# Patient Record
Sex: FEMALE | Race: WHITE | NOT HISPANIC OR LATINO
[De-identification: names, ages, dates, MRNs, and addresses within clinical notes are randomized per-mention and may not be internally consistent; named-entity substitution may affect disease eponyms.]

---

## 2017-01-18 ENCOUNTER — APPOINTMENT (OUTPATIENT)
Dept: INTERNAL MEDICINE | Facility: CLINIC | Age: 42
End: 2017-01-18

## 2017-01-18 VITALS
BODY MASS INDEX: 29.45 KG/M2 | TEMPERATURE: 98 F | WEIGHT: 150 LBS | HEIGHT: 60 IN | SYSTOLIC BLOOD PRESSURE: 130 MMHG | DIASTOLIC BLOOD PRESSURE: 80 MMHG

## 2017-01-19 ENCOUNTER — RESULT REVIEW (OUTPATIENT)
Age: 42
End: 2017-01-19

## 2017-04-26 ENCOUNTER — LABORATORY RESULT (OUTPATIENT)
Age: 42
End: 2017-04-26

## 2017-04-26 ENCOUNTER — APPOINTMENT (OUTPATIENT)
Dept: INTERNAL MEDICINE | Facility: CLINIC | Age: 42
End: 2017-04-26

## 2017-04-27 LAB
25(OH)D3 SERPL-MCNC: 45.9 NG/ML
ALBUMIN SERPL ELPH-MCNC: 4.1 G/DL
ALP BLD-CCNC: 58 U/L
ALT SERPL-CCNC: 17 U/L
ANION GAP SERPL CALC-SCNC: 13 MMOL/L
APPEARANCE: CLEAR
AST SERPL-CCNC: 19 U/L
BASOPHILS # BLD AUTO: 0.02 K/UL
BASOPHILS NFR BLD AUTO: 0.3 %
BILIRUB SERPL-MCNC: 0.4 MG/DL
BILIRUBIN URINE: NEGATIVE
BLOOD URINE: NEGATIVE
BUN SERPL-MCNC: 7 MG/DL
CALCIUM SERPL-MCNC: 9.3 MG/DL
CHLORIDE SERPL-SCNC: 107 MMOL/L
CHOLEST SERPL-MCNC: 156 MG/DL
CHOLEST/HDLC SERPL: 3 RATIO
CO2 SERPL-SCNC: 21 MMOL/L
COLOR: YELLOW
CREAT SERPL-MCNC: 0.66 MG/DL
EOSINOPHIL # BLD AUTO: 0.18 K/UL
EOSINOPHIL NFR BLD AUTO: 2.6 %
ERYTHROCYTE [SEDIMENTATION RATE] IN BLOOD BY WESTERGREN METHOD: 7 MM/HR
GLUCOSE QUALITATIVE U: NORMAL MG/DL
GLUCOSE SERPL-MCNC: 98 MG/DL
HCT VFR BLD CALC: 42.7 %
HDLC SERPL-MCNC: 52 MG/DL
HGB BLD-MCNC: 14.2 G/DL
IMM GRANULOCYTES NFR BLD AUTO: 0.1 %
KETONES URINE: NEGATIVE
LDLC SERPL CALC-MCNC: 95 MG/DL
LDLC SERPL DIRECT ASSAY-MCNC: 100 MG/DL
LEUKOCYTE ESTERASE URINE: NEGATIVE
LYMPHOCYTES # BLD AUTO: 2.94 K/UL
LYMPHOCYTES NFR BLD AUTO: 41.8 %
MAN DIFF?: NORMAL
MCHC RBC-ENTMCNC: 30.9 PG
MCHC RBC-ENTMCNC: 33.3 GM/DL
MCV RBC AUTO: 93 FL
MONOCYTES # BLD AUTO: 0.51 K/UL
MONOCYTES NFR BLD AUTO: 7.3 %
NEUTROPHILS # BLD AUTO: 3.37 K/UL
NEUTROPHILS NFR BLD AUTO: 47.9 %
NITRITE URINE: NEGATIVE
PH URINE: 7
PLATELET # BLD AUTO: 359 K/UL
POTASSIUM SERPL-SCNC: 4.3 MMOL/L
PROT SERPL-MCNC: 7.5 G/DL
PROTEIN URINE: NEGATIVE MG/DL
RBC # BLD: 4.59 M/UL
RBC # FLD: 12.1 %
SAVE SPECIMEN: NORMAL
SODIUM SERPL-SCNC: 141 MMOL/L
SPECIFIC GRAVITY URINE: 1.01
T3 SERPL-MCNC: 107 NG/DL
T3RU NFR SERPL: 0.93 INDEX
T4 FREE SERPL-MCNC: 1.3 NG/DL
TRIGL SERPL-MCNC: 45 MG/DL
TSH SERPL-ACNC: 0.71 UIU/ML
UROBILINOGEN URINE: NORMAL MG/DL
WBC # FLD AUTO: 7.03 K/UL

## 2017-05-03 ENCOUNTER — APPOINTMENT (OUTPATIENT)
Dept: INTERNAL MEDICINE | Facility: CLINIC | Age: 42
End: 2017-05-03

## 2017-05-03 VITALS
TEMPERATURE: 96.7 F | HEIGHT: 60 IN | SYSTOLIC BLOOD PRESSURE: 120 MMHG | WEIGHT: 154 LBS | DIASTOLIC BLOOD PRESSURE: 80 MMHG | BODY MASS INDEX: 30.23 KG/M2

## 2017-05-03 DIAGNOSIS — B96.89 ACUTE VAGINITIS: ICD-10-CM

## 2017-05-03 DIAGNOSIS — R92.2 INCONCLUSIVE MAMMOGRAM: ICD-10-CM

## 2017-05-03 DIAGNOSIS — J30.9 ALLERGIC RHINITIS, UNSPECIFIED: ICD-10-CM

## 2017-05-03 DIAGNOSIS — N76.0 ACUTE VAGINITIS: ICD-10-CM

## 2017-07-14 ENCOUNTER — MEDICATION RENEWAL (OUTPATIENT)
Age: 42
End: 2017-07-14

## 2017-08-31 ENCOUNTER — APPOINTMENT (OUTPATIENT)
Dept: INTERNAL MEDICINE | Facility: CLINIC | Age: 42
End: 2017-08-31

## 2017-09-02 ENCOUNTER — TRANSCRIPTION ENCOUNTER (OUTPATIENT)
Age: 42
End: 2017-09-02

## 2017-09-11 ENCOUNTER — APPOINTMENT (OUTPATIENT)
Dept: INTERNAL MEDICINE | Facility: CLINIC | Age: 42
End: 2017-09-11
Payer: COMMERCIAL

## 2017-09-11 ENCOUNTER — APPOINTMENT (OUTPATIENT)
Dept: RADIOLOGY | Facility: CLINIC | Age: 42
End: 2017-09-11
Payer: COMMERCIAL

## 2017-09-11 ENCOUNTER — OUTPATIENT (OUTPATIENT)
Dept: OUTPATIENT SERVICES | Facility: HOSPITAL | Age: 42
LOS: 1 days | End: 2017-09-11
Payer: COMMERCIAL

## 2017-09-11 VITALS
OXYGEN SATURATION: 97 % | DIASTOLIC BLOOD PRESSURE: 80 MMHG | WEIGHT: 147 LBS | SYSTOLIC BLOOD PRESSURE: 130 MMHG | HEIGHT: 60 IN | HEART RATE: 87 BPM | BODY MASS INDEX: 28.86 KG/M2 | TEMPERATURE: 98.3 F

## 2017-09-11 DIAGNOSIS — R07.89 OTHER CHEST PAIN: ICD-10-CM

## 2017-09-11 DIAGNOSIS — Z00.8 ENCOUNTER FOR OTHER GENERAL EXAMINATION: ICD-10-CM

## 2017-09-11 PROCEDURE — 71020: CPT | Mod: 26

## 2017-09-11 PROCEDURE — 99214 OFFICE O/P EST MOD 30 MIN: CPT

## 2017-09-11 PROCEDURE — 71046 X-RAY EXAM CHEST 2 VIEWS: CPT

## 2017-09-11 RX ORDER — AZITHROMYCIN 500 MG/1
500 TABLET, FILM COATED ORAL
Qty: 3 | Refills: 0 | Status: DISCONTINUED | COMMUNITY
Start: 2017-09-02

## 2017-09-19 ENCOUNTER — APPOINTMENT (OUTPATIENT)
Dept: GASTROENTEROLOGY | Facility: CLINIC | Age: 42
End: 2017-09-19

## 2017-11-09 ENCOUNTER — APPOINTMENT (OUTPATIENT)
Dept: INTERNAL MEDICINE | Facility: CLINIC | Age: 42
End: 2017-11-09
Payer: COMMERCIAL

## 2017-11-09 VITALS
TEMPERATURE: 96.8 F | BODY MASS INDEX: 29.84 KG/M2 | HEIGHT: 60 IN | WEIGHT: 152 LBS | SYSTOLIC BLOOD PRESSURE: 120 MMHG | DIASTOLIC BLOOD PRESSURE: 80 MMHG

## 2017-11-09 DIAGNOSIS — L25.9 UNSPECIFIED CONTACT DERMATITIS, UNSPECIFIED CAUSE: ICD-10-CM

## 2017-11-09 DIAGNOSIS — J06.9 ACUTE UPPER RESPIRATORY INFECTION, UNSPECIFIED: ICD-10-CM

## 2017-11-09 PROCEDURE — 99214 OFFICE O/P EST MOD 30 MIN: CPT

## 2017-11-09 RX ORDER — FLUTICASONE PROPIONATE 50 UG/1
50 SPRAY, METERED NASAL DAILY
Qty: 1 | Refills: 1 | Status: COMPLETED | COMMUNITY
Start: 2017-09-11 | End: 2017-11-09

## 2017-11-09 RX ORDER — LEVOFLOXACIN 500 MG/1
500 TABLET, FILM COATED ORAL DAILY
Qty: 7 | Refills: 0 | Status: COMPLETED | COMMUNITY
Start: 2017-09-11 | End: 2017-11-09

## 2017-12-14 ENCOUNTER — MEDICATION RENEWAL (OUTPATIENT)
Age: 42
End: 2017-12-14

## 2017-12-14 DIAGNOSIS — L30.9 DERMATITIS, UNSPECIFIED: ICD-10-CM

## 2017-12-14 RX ORDER — CLOBETASOL PROPIONATE 0.5 MG/G
0.05 CREAM TOPICAL TWICE DAILY
Qty: 1 | Refills: 0 | Status: DISCONTINUED | COMMUNITY
Start: 2017-11-09 | End: 2017-12-14

## 2017-12-22 ENCOUNTER — RESULT REVIEW (OUTPATIENT)
Age: 42
End: 2017-12-22

## 2017-12-27 ENCOUNTER — APPOINTMENT (OUTPATIENT)
Dept: UROGYNECOLOGY | Facility: CLINIC | Age: 42
End: 2017-12-27
Payer: COMMERCIAL

## 2017-12-27 VITALS
WEIGHT: 152 LBS | SYSTOLIC BLOOD PRESSURE: 120 MMHG | BODY MASS INDEX: 29.84 KG/M2 | HEIGHT: 60 IN | DIASTOLIC BLOOD PRESSURE: 80 MMHG

## 2017-12-27 DIAGNOSIS — R35.0 FREQUENCY OF MICTURITION: ICD-10-CM

## 2017-12-27 DIAGNOSIS — R39.15 URGENCY OF URINATION: ICD-10-CM

## 2017-12-27 DIAGNOSIS — M62.89 OTHER SPECIFIED DISORDERS OF MUSCLE: ICD-10-CM

## 2017-12-27 DIAGNOSIS — N39.3 STRESS INCONTINENCE (FEMALE) (MALE): ICD-10-CM

## 2017-12-27 PROCEDURE — 51701 INSERT BLADDER CATHETER: CPT

## 2017-12-27 PROCEDURE — 99244 OFF/OP CNSLTJ NEW/EST MOD 40: CPT | Mod: 25

## 2017-12-28 ENCOUNTER — LABORATORY RESULT (OUTPATIENT)
Age: 42
End: 2017-12-28

## 2017-12-28 ENCOUNTER — APPOINTMENT (OUTPATIENT)
Dept: UROLOGY | Facility: CLINIC | Age: 42
End: 2017-12-28
Payer: COMMERCIAL

## 2017-12-28 ENCOUNTER — RESULT REVIEW (OUTPATIENT)
Age: 42
End: 2017-12-28

## 2017-12-28 VITALS
OXYGEN SATURATION: 98 % | HEART RATE: 87 BPM | WEIGHT: 152 LBS | HEIGHT: 60 IN | BODY MASS INDEX: 29.84 KG/M2 | DIASTOLIC BLOOD PRESSURE: 70 MMHG | SYSTOLIC BLOOD PRESSURE: 122 MMHG

## 2017-12-28 DIAGNOSIS — N90.89 OTHER SPECIFIED NONINFLAMMATORY DISORDERS OF VULVA AND PERINEUM: ICD-10-CM

## 2017-12-28 LAB
BILIRUB UR QL STRIP: NORMAL
CLARITY UR: CLEAR
GLUCOSE UR-MCNC: NORMAL
HCG UR QL: 0.2 EU/DL
HGB UR QL STRIP.AUTO: NORMAL
KETONES UR-MCNC: NORMAL
LEUKOCYTE ESTERASE UR QL STRIP: NORMAL
NITRITE UR QL STRIP: NORMAL
PH UR STRIP: 6
PROT UR STRIP-MCNC: NORMAL
SP GR UR STRIP: 1

## 2017-12-28 PROCEDURE — 56605 BIOPSY OF VULVA/PERINEUM: CPT

## 2017-12-28 PROCEDURE — 99205 OFFICE O/P NEW HI 60 MIN: CPT | Mod: 25

## 2018-01-15 ENCOUNTER — MEDICATION RENEWAL (OUTPATIENT)
Age: 43
End: 2018-01-15

## 2018-01-19 ENCOUNTER — APPOINTMENT (OUTPATIENT)
Dept: UROLOGY | Facility: CLINIC | Age: 43
End: 2018-01-19
Payer: COMMERCIAL

## 2018-01-19 VITALS
OXYGEN SATURATION: 97 % | WEIGHT: 152 LBS | SYSTOLIC BLOOD PRESSURE: 110 MMHG | HEIGHT: 60 IN | RESPIRATION RATE: 16 BRPM | DIASTOLIC BLOOD PRESSURE: 70 MMHG | HEART RATE: 74 BPM | BODY MASS INDEX: 29.84 KG/M2 | TEMPERATURE: 98.1 F

## 2018-01-19 PROCEDURE — 51798 US URINE CAPACITY MEASURE: CPT

## 2018-01-19 PROCEDURE — 99214 OFFICE O/P EST MOD 30 MIN: CPT

## 2018-01-19 PROCEDURE — 87210 SMEAR WET MOUNT SALINE/INK: CPT | Mod: QW

## 2018-01-19 PROCEDURE — 83986 ASSAY PH BODY FLUID NOS: CPT | Mod: QW

## 2018-01-23 LAB
APPEARANCE: CLEAR
BACTERIA UR CULT: NORMAL
BACTERIA: NEGATIVE
BILIRUBIN URINE: NEGATIVE
BLOOD URINE: NEGATIVE
COLOR: YELLOW
GLUCOSE QUALITATIVE U: NEGATIVE MG/DL
HYALINE CASTS: 0 /LPF
KETONES URINE: NEGATIVE
LEUKOCYTE ESTERASE URINE: NEGATIVE
MICROSCOPIC-UA: NORMAL
NITRITE URINE: NEGATIVE
PH URINE: 6.5
PROTEIN URINE: NEGATIVE MG/DL
RED BLOOD CELLS URINE: 4 /HPF
SPECIFIC GRAVITY URINE: 1.03
SQUAMOUS EPITHELIAL CELLS: 4 /HPF
URINE COMMENTS: NORMAL
UROBILINOGEN URINE: 1 MG/DL
WHITE BLOOD CELLS URINE: 2 /HPF

## 2018-01-24 LAB
A VAGINAE DNA VAG QL NAA+PROBE: NORMAL
BVAB2 DNA VAG QL NAA+PROBE: NORMAL
C KRUSEI DNA VAG QL NAA+PROBE: NEGATIVE
C TRACH RRNA SPEC QL NAA+PROBE: NEGATIVE
MEGA1 DNA VAG QL NAA+PROBE: NORMAL
N GONORRHOEA RRNA SPEC QL NAA+PROBE: NEGATIVE
T VAGINALIS RRNA SPEC QL NAA+PROBE: NEGATIVE

## 2018-02-16 ENCOUNTER — APPOINTMENT (OUTPATIENT)
Dept: UROLOGY | Facility: CLINIC | Age: 43
End: 2018-02-16
Payer: COMMERCIAL

## 2018-02-16 VITALS
HEART RATE: 84 BPM | SYSTOLIC BLOOD PRESSURE: 114 MMHG | OXYGEN SATURATION: 97 % | DIASTOLIC BLOOD PRESSURE: 84 MMHG | TEMPERATURE: 97 F

## 2018-02-16 DIAGNOSIS — R39.89 OTHER SYMPTOMS AND SIGNS INVOLVING THE GENITOURINARY SYSTEM: ICD-10-CM

## 2018-02-16 DIAGNOSIS — L29.2 PRURITUS VULVAE: ICD-10-CM

## 2018-02-16 DIAGNOSIS — A63.0 ANOGENITAL (VENEREAL) WARTS: ICD-10-CM

## 2018-02-16 PROCEDURE — 99214 OFFICE O/P EST MOD 30 MIN: CPT

## 2018-03-01 ENCOUNTER — APPOINTMENT (OUTPATIENT)
Dept: INTERNAL MEDICINE | Facility: CLINIC | Age: 43
End: 2018-03-01
Payer: COMMERCIAL

## 2018-03-01 VITALS
WEIGHT: 146 LBS | TEMPERATURE: 97.4 F | BODY MASS INDEX: 28.66 KG/M2 | HEIGHT: 60 IN | DIASTOLIC BLOOD PRESSURE: 70 MMHG | SYSTOLIC BLOOD PRESSURE: 110 MMHG

## 2018-03-01 PROCEDURE — 94010 BREATHING CAPACITY TEST: CPT

## 2018-03-01 PROCEDURE — 99396 PREV VISIT EST AGE 40-64: CPT | Mod: 25

## 2018-03-01 PROCEDURE — 93000 ELECTROCARDIOGRAM COMPLETE: CPT

## 2018-03-01 RX ORDER — OXYBUTYNIN CHLORIDE 10 MG/1
10 TABLET, EXTENDED RELEASE ORAL
Qty: 30 | Refills: 5 | Status: COMPLETED | COMMUNITY
Start: 2017-12-27 | End: 2018-03-01

## 2018-03-01 RX ORDER — CLOBETASOL PROPIONATE 0.5 MG/G
0.05 OINTMENT TOPICAL TWICE DAILY
Qty: 1 | Refills: 2 | Status: COMPLETED | COMMUNITY
End: 2018-03-01

## 2018-03-08 ENCOUNTER — APPOINTMENT (OUTPATIENT)
Dept: INTERNAL MEDICINE | Facility: CLINIC | Age: 43
End: 2018-03-08
Payer: COMMERCIAL

## 2018-03-26 ENCOUNTER — APPOINTMENT (OUTPATIENT)
Dept: INTERNAL MEDICINE | Facility: CLINIC | Age: 43
End: 2018-03-26
Payer: COMMERCIAL

## 2018-03-26 ENCOUNTER — LABORATORY RESULT (OUTPATIENT)
Age: 43
End: 2018-03-26

## 2018-03-26 ENCOUNTER — MEDICATION RENEWAL (OUTPATIENT)
Age: 43
End: 2018-03-26

## 2018-03-26 PROCEDURE — 36415 COLL VENOUS BLD VENIPUNCTURE: CPT

## 2018-03-27 ENCOUNTER — APPOINTMENT (OUTPATIENT)
Dept: UROGYNECOLOGY | Facility: CLINIC | Age: 43
End: 2018-03-27

## 2018-04-23 ENCOUNTER — APPOINTMENT (OUTPATIENT)
Dept: INTERNAL MEDICINE | Facility: CLINIC | Age: 43
End: 2018-04-23
Payer: COMMERCIAL

## 2018-04-23 VITALS
TEMPERATURE: 98 F | HEIGHT: 60 IN | WEIGHT: 150 LBS | SYSTOLIC BLOOD PRESSURE: 122 MMHG | BODY MASS INDEX: 29.45 KG/M2 | DIASTOLIC BLOOD PRESSURE: 70 MMHG

## 2018-04-23 DIAGNOSIS — Z11.1 ENCOUNTER FOR SCREENING FOR RESPIRATORY TUBERCULOSIS: ICD-10-CM

## 2018-04-23 PROCEDURE — 86580 TB INTRADERMAL TEST: CPT

## 2018-05-03 ENCOUNTER — APPOINTMENT (OUTPATIENT)
Dept: INTERNAL MEDICINE | Facility: CLINIC | Age: 43
End: 2018-05-03

## 2018-05-10 ENCOUNTER — APPOINTMENT (OUTPATIENT)
Dept: INTERNAL MEDICINE | Facility: CLINIC | Age: 43
End: 2018-05-10

## 2018-08-02 ENCOUNTER — APPOINTMENT (OUTPATIENT)
Dept: ORTHOPEDIC SURGERY | Facility: CLINIC | Age: 43
End: 2018-08-02
Payer: COMMERCIAL

## 2018-08-02 VITALS
HEIGHT: 60 IN | WEIGHT: 150 LBS | DIASTOLIC BLOOD PRESSURE: 76 MMHG | HEART RATE: 83 BPM | BODY MASS INDEX: 29.45 KG/M2 | SYSTOLIC BLOOD PRESSURE: 113 MMHG

## 2018-08-02 DIAGNOSIS — S46.911A STRAIN OF UNSPECIFIED MUSCLE, FASCIA AND TENDON AT SHOULDER AND UPPER ARM LEVEL, RIGHT ARM, INITIAL ENCOUNTER: ICD-10-CM

## 2018-08-02 PROCEDURE — 99203 OFFICE O/P NEW LOW 30 MIN: CPT

## 2018-08-02 PROCEDURE — 73030 X-RAY EXAM OF SHOULDER: CPT | Mod: RT

## 2018-08-03 ENCOUNTER — MEDICATION RENEWAL (OUTPATIENT)
Age: 43
End: 2018-08-03

## 2018-08-03 PROBLEM — S46.911A STRAIN OF RIGHT SHOULDER, INITIAL ENCOUNTER: Status: ACTIVE | Noted: 2018-08-03

## 2018-11-12 ENCOUNTER — MEDICATION RENEWAL (OUTPATIENT)
Age: 43
End: 2018-11-12

## 2018-12-08 ENCOUNTER — APPOINTMENT (OUTPATIENT)
Dept: INTERNAL MEDICINE | Facility: CLINIC | Age: 43
End: 2018-12-08
Payer: COMMERCIAL

## 2018-12-08 VITALS
DIASTOLIC BLOOD PRESSURE: 80 MMHG | WEIGHT: 147 LBS | SYSTOLIC BLOOD PRESSURE: 124 MMHG | HEIGHT: 60 IN | BODY MASS INDEX: 28.86 KG/M2 | TEMPERATURE: 98.7 F

## 2018-12-08 DIAGNOSIS — R68.89 OTHER GENERAL SYMPTOMS AND SIGNS: ICD-10-CM

## 2018-12-08 DIAGNOSIS — Z87.09 PERSONAL HISTORY OF OTHER DISEASES OF THE RESPIRATORY SYSTEM: ICD-10-CM

## 2018-12-08 PROCEDURE — 99213 OFFICE O/P EST LOW 20 MIN: CPT | Mod: 25

## 2018-12-08 PROCEDURE — 87804 INFLUENZA ASSAY W/OPTIC: CPT | Mod: QW

## 2018-12-08 PROCEDURE — 94010 BREATHING CAPACITY TEST: CPT

## 2018-12-08 NOTE — HISTORY OF PRESENT ILLNESS
[Congestion] : congestion [Cold Symptoms] : cold symptoms [Moderate] : moderate [___ Days ago] : [unfilled] days ago [Sudden] : suddenly [Chills] : chills [Shortness Of Breath] : shortness of breath [Fatigue] : fatigue [Fever] : fever [Worsening] : worsening [FreeTextEntry2] : maxilary sinus pressure

## 2018-12-10 LAB
FLU A RESULT: NOT DETECTED
FLU B RESULT: NOT DETECTED
RSV RESULT: NOT DETECTED

## 2018-12-23 ENCOUNTER — FORM ENCOUNTER (OUTPATIENT)
Age: 43
End: 2018-12-23

## 2018-12-24 ENCOUNTER — LABORATORY RESULT (OUTPATIENT)
Age: 43
End: 2018-12-24

## 2018-12-24 ENCOUNTER — APPOINTMENT (OUTPATIENT)
Dept: INTERNAL MEDICINE | Facility: CLINIC | Age: 43
End: 2018-12-24
Payer: COMMERCIAL

## 2018-12-24 ENCOUNTER — APPOINTMENT (OUTPATIENT)
Dept: RADIOLOGY | Facility: CLINIC | Age: 43
End: 2018-12-24
Payer: COMMERCIAL

## 2018-12-24 ENCOUNTER — OUTPATIENT (OUTPATIENT)
Dept: OUTPATIENT SERVICES | Facility: HOSPITAL | Age: 43
LOS: 1 days | End: 2018-12-24
Payer: COMMERCIAL

## 2018-12-24 VITALS
BODY MASS INDEX: 28.07 KG/M2 | WEIGHT: 143 LBS | SYSTOLIC BLOOD PRESSURE: 110 MMHG | HEIGHT: 60 IN | DIASTOLIC BLOOD PRESSURE: 70 MMHG | TEMPERATURE: 98.4 F

## 2018-12-24 DIAGNOSIS — Z78.9 OTHER SPECIFIED HEALTH STATUS: ICD-10-CM

## 2018-12-24 DIAGNOSIS — L65.9 NONSCARRING HAIR LOSS, UNSPECIFIED: ICD-10-CM

## 2018-12-24 DIAGNOSIS — Z60.2 PROBLEMS RELATED TO LIVING ALONE: ICD-10-CM

## 2018-12-24 DIAGNOSIS — Z80.7 FAMILY HISTORY OF OTHER MALIGNANT NEOPLASMS OF LYMPHOID, HEMATOPOIETIC AND RELATED TISSUES: ICD-10-CM

## 2018-12-24 DIAGNOSIS — Z82.61 FAMILY HISTORY OF ARTHRITIS: ICD-10-CM

## 2018-12-24 DIAGNOSIS — R05 COUGH: ICD-10-CM

## 2018-12-24 PROCEDURE — 71046 X-RAY EXAM CHEST 2 VIEWS: CPT | Mod: 26

## 2018-12-24 PROCEDURE — 99214 OFFICE O/P EST MOD 30 MIN: CPT | Mod: 25

## 2018-12-24 PROCEDURE — 93000 ELECTROCARDIOGRAM COMPLETE: CPT

## 2018-12-24 PROCEDURE — 36415 COLL VENOUS BLD VENIPUNCTURE: CPT

## 2018-12-24 PROCEDURE — 71046 X-RAY EXAM CHEST 2 VIEWS: CPT

## 2018-12-24 SDOH — SOCIAL STABILITY - SOCIAL INSECURITY: PROBLEMS RELATED TO LIVING ALONE: Z60.2

## 2018-12-26 LAB
ALBUMIN SERPL ELPH-MCNC: 4.2 G/DL
ALP BLD-CCNC: 52 U/L
ALT SERPL-CCNC: 12 U/L
ANION GAP SERPL CALC-SCNC: 8 MMOL/L
AST SERPL-CCNC: 12 U/L
BASOPHILS # BLD AUTO: 0.03 K/UL
BASOPHILS NFR BLD AUTO: 0.5 %
BILIRUB SERPL-MCNC: 0.5 MG/DL
BUN SERPL-MCNC: 13 MG/DL
CALCIUM SERPL-MCNC: 9.5 MG/DL
CHLORIDE SERPL-SCNC: 105 MMOL/L
CO2 SERPL-SCNC: 24 MMOL/L
CREAT SERPL-MCNC: 0.66 MG/DL
CRP SERPL-MCNC: <0.1 MG/DL
EOSINOPHIL # BLD AUTO: 0.15 K/UL
EOSINOPHIL NFR BLD AUTO: 2.3 %
ERYTHROCYTE [SEDIMENTATION RATE] IN BLOOD BY WESTERGREN METHOD: 4 MM/HR
FERRITIN SERPL-MCNC: 141 NG/ML
FSH SERPL-MCNC: 11 IU/L
GLUCOSE SERPL-MCNC: 93 MG/DL
HCT VFR BLD CALC: 40.9 %
HGB BLD-MCNC: 13.5 G/DL
IMM GRANULOCYTES NFR BLD AUTO: 0.2 %
IRON SATN MFR SERPL: 50 %
IRON SERPL-MCNC: 128 UG/DL
LH SERPL-ACNC: 3.2 IU/L
LYMPHOCYTES # BLD AUTO: 2.31 K/UL
LYMPHOCYTES NFR BLD AUTO: 35.3 %
MAN DIFF?: NORMAL
MCHC RBC-ENTMCNC: 30.3 PG
MCHC RBC-ENTMCNC: 33 GM/DL
MCV RBC AUTO: 91.9 FL
MONOCYTES # BLD AUTO: 0.56 K/UL
MONOCYTES NFR BLD AUTO: 8.6 %
NEUTROPHILS # BLD AUTO: 3.48 K/UL
NEUTROPHILS NFR BLD AUTO: 53.1 %
PLATELET # BLD AUTO: 349 K/UL
POTASSIUM SERPL-SCNC: 4.4 MMOL/L
PROT SERPL-MCNC: 6.9 G/DL
RBC # BLD: 4.45 M/UL
RBC # FLD: 12.1 %
SAVE SPECIMEN: NORMAL
SODIUM SERPL-SCNC: 137 MMOL/L
T3 SERPL-MCNC: 80 NG/DL
T3FREE SERPL-MCNC: 2.46 PG/ML
T3RU NFR SERPL: 1.01 INDEX
T4 FREE SERPL-MCNC: 1.1 NG/DL
T4 SERPL-MCNC: 6.4 UG/DL
TIBC SERPL-MCNC: 257 UG/DL
TSH SERPL-ACNC: 1 UIU/ML
UIBC SERPL-MCNC: 129 UG/DL
VIT B12 SERPL-MCNC: 381 PG/ML
WBC # FLD AUTO: 6.54 K/UL

## 2018-12-27 LAB — ANA SER IF-ACNC: NEGATIVE

## 2019-01-03 PROBLEM — Z78.9 EXERCISES RARELY: Status: ACTIVE | Noted: 2018-08-02

## 2019-01-03 PROBLEM — Z78.9 CURRENT NON-SMOKER: Status: ACTIVE | Noted: 2018-08-02

## 2019-01-03 PROBLEM — Z78.9 DOES NOT USE ILLICIT DRUGS: Status: ACTIVE | Noted: 2018-08-02

## 2019-01-03 PROBLEM — Z78.9 CONSUMES ALCOHOL OCCASIONALLY: Status: ACTIVE | Noted: 2018-08-02

## 2019-01-03 PROBLEM — Z82.61 FAMILY HISTORY OF ARTHRITIS: Status: ACTIVE | Noted: 2018-08-02

## 2019-01-03 PROBLEM — Z80.7 FAMILY HISTORY OF HODGKIN'S LYMPHOMA: Status: ACTIVE | Noted: 2018-08-02

## 2019-01-03 NOTE — DATA REVIEWED
[FreeTextEntry1] : EKG from today revealed NSR with nonspecific inferior t wave changes--no change from previous EKG from 03/01/2018\par

## 2019-01-03 NOTE — HISTORY OF PRESENT ILLNESS
[FreeTextEntry1] : Patient presents for follow up evaluation for multiple medical concerns including:\par new onset perimenopause with noticeable hair thinning, fatigue, mild increased anxiety/ occasional palpitations\par

## 2019-01-03 NOTE — REVIEW OF SYSTEMS
[Fatigue] : fatigue [Palpitations] : palpitations [Cough] : cough [Back Pain] : back pain [Anxiety] : anxiety [Negative] : Heme/Lymph [Hot Flashes] : hot flashes [de-identified] : hair thinning

## 2019-01-14 ENCOUNTER — APPOINTMENT (OUTPATIENT)
Dept: CARDIOLOGY | Facility: CLINIC | Age: 44
End: 2019-01-14
Payer: COMMERCIAL

## 2019-01-14 VITALS
SYSTOLIC BLOOD PRESSURE: 120 MMHG | DIASTOLIC BLOOD PRESSURE: 74 MMHG | TEMPERATURE: 99 F | WEIGHT: 153 LBS | HEIGHT: 60 IN | OXYGEN SATURATION: 97 % | BODY MASS INDEX: 30.04 KG/M2 | HEART RATE: 88 BPM

## 2019-01-14 VITALS — SYSTOLIC BLOOD PRESSURE: 112 MMHG | DIASTOLIC BLOOD PRESSURE: 70 MMHG

## 2019-01-14 DIAGNOSIS — R00.2 PALPITATIONS: ICD-10-CM

## 2019-01-14 PROCEDURE — 99243 OFF/OP CNSLTJ NEW/EST LOW 30: CPT

## 2019-01-14 RX ORDER — TERCONAZOLE 80 MG/1
80 SUPPOSITORY VAGINAL
Qty: 3 | Refills: 1 | Status: DISCONTINUED | COMMUNITY
Start: 2018-03-26 | End: 2019-01-14

## 2019-01-14 RX ORDER — ALBUTEROL SULFATE 90 UG/1
108 (90 BASE) AEROSOL, METERED RESPIRATORY (INHALATION)
Qty: 1 | Refills: 0 | Status: DISCONTINUED | COMMUNITY
Start: 2018-12-24 | End: 2019-01-14

## 2019-01-14 RX ORDER — IMIQUIMOD 50 MG/G
5 CREAM TOPICAL
Qty: 1 | Refills: 2 | Status: DISCONTINUED | COMMUNITY
Start: 2018-01-12 | End: 2019-01-14

## 2019-01-14 RX ORDER — AZITHROMYCIN 250 MG/1
250 TABLET, FILM COATED ORAL
Qty: 1 | Refills: 1 | Status: DISCONTINUED | COMMUNITY
Start: 2017-11-09 | End: 2019-01-14

## 2019-01-14 NOTE — PHYSICAL EXAM
[General Appearance - Well Developed] : well developed [General Appearance - Well Nourished] : well nourished [General Appearance - In No Acute Distress] : no acute distress [Normal Conjunctiva] : the conjunctiva exhibited no abnormalities [No Oral Pallor] : no oral pallor [Normal Jugular Venous V Waves Present] : normal jugular venous V waves present [Heart Sounds] : normal S1 and S2 [Edema] : no peripheral edema present [Respiration, Rhythm And Depth] : normal respiratory rhythm and effort [Auscultation Breath Sounds / Voice Sounds] : lungs were clear to auscultation bilaterally [Bowel Sounds] : normal bowel sounds [Abdomen Soft] : soft [Abnormal Walk] : normal gait [Cyanosis, Localized] : no localized cyanosis [Skin Turgor] : normal skin turgor [Oriented To Time, Place, And Person] : oriented to person, place, and time [Impaired Insight] : insight and judgment were intact [Affect] : the affect was normal

## 2019-01-14 NOTE — HISTORY OF PRESENT ILLNESS
[FreeTextEntry1] : Dear Dr. Dunn;\par Thank you for referring her for cardiovascular evaluation. She is a 43-year-old who was recently seen in your office for a upper respiratory infection. She has also been noting symptoms of palpitations over the past 6 months. She describes is as strong or extra heartbeats that occur mostly at rest, sometimes even awaken her from sleep. These episodes last from a few minutes to up to 20 minutes. There is some associated chest tightness during these events, but no nausea, lightheadedness, dizziness or syncope.\par She does not routinely exercise was able to go about her usual activities without difficulty.\par She denies any stimulant use but does drink alcohol 3 or 4 times a week.\par She has no history of coronary artery disease, hypertension, diabetes mellitus, hypercholesterolemia, smoking.\par

## 2019-01-18 ENCOUNTER — APPOINTMENT (OUTPATIENT)
Dept: CARDIOLOGY | Facility: CLINIC | Age: 44
End: 2019-01-18
Payer: COMMERCIAL

## 2019-01-18 ENCOUNTER — NON-APPOINTMENT (OUTPATIENT)
Age: 44
End: 2019-01-18

## 2019-01-18 PROCEDURE — 93306 TTE W/DOPPLER COMPLETE: CPT

## 2019-01-22 ENCOUNTER — NON-APPOINTMENT (OUTPATIENT)
Age: 44
End: 2019-01-22

## 2019-01-23 ENCOUNTER — NON-APPOINTMENT (OUTPATIENT)
Age: 44
End: 2019-01-23

## 2019-01-23 ENCOUNTER — CLINICAL ADVICE (OUTPATIENT)
Age: 44
End: 2019-01-23

## 2019-01-23 ENCOUNTER — APPOINTMENT (OUTPATIENT)
Dept: CARDIOLOGY | Facility: CLINIC | Age: 44
End: 2019-01-23
Payer: COMMERCIAL

## 2019-01-23 PROCEDURE — 93224 XTRNL ECG REC UP TO 48 HRS: CPT

## 2019-02-13 ENCOUNTER — MEDICATION RENEWAL (OUTPATIENT)
Age: 44
End: 2019-02-13

## 2019-02-27 ENCOUNTER — LABORATORY RESULT (OUTPATIENT)
Age: 44
End: 2019-02-27

## 2019-02-27 ENCOUNTER — APPOINTMENT (OUTPATIENT)
Dept: INTERNAL MEDICINE | Facility: CLINIC | Age: 44
End: 2019-02-27
Payer: COMMERCIAL

## 2019-02-27 PROCEDURE — 36415 COLL VENOUS BLD VENIPUNCTURE: CPT

## 2019-02-28 LAB
25(OH)D3 SERPL-MCNC: 15 NG/ML
ALBUMIN SERPL ELPH-MCNC: 4.3 G/DL
ALP BLD-CCNC: 54 U/L
ALT SERPL-CCNC: 12 U/L
ANION GAP SERPL CALC-SCNC: 11 MMOL/L
APPEARANCE: CLEAR
AST SERPL-CCNC: 9 U/L
BASOPHILS # BLD AUTO: 0.05 K/UL
BASOPHILS NFR BLD AUTO: 0.5 %
BILIRUB SERPL-MCNC: 0.5 MG/DL
BILIRUBIN URINE: NEGATIVE
BLOOD URINE: NEGATIVE
BUN SERPL-MCNC: 18 MG/DL
CALCIUM SERPL-MCNC: 9.4 MG/DL
CHLORIDE SERPL-SCNC: 104 MMOL/L
CHOLEST SERPL-MCNC: 162 MG/DL
CHOLEST/HDLC SERPL: 3.4 RATIO
CO2 SERPL-SCNC: 24 MMOL/L
COLOR: NORMAL
CREAT SERPL-MCNC: 0.69 MG/DL
EOSINOPHIL # BLD AUTO: 0.19 K/UL
EOSINOPHIL NFR BLD AUTO: 1.9 %
ERYTHROCYTE [SEDIMENTATION RATE] IN BLOOD BY WESTERGREN METHOD: 8 MM/HR
GLUCOSE QUALITATIVE U: NEGATIVE
GLUCOSE SERPL-MCNC: 106 MG/DL
HCT VFR BLD CALC: 44.5 %
HDLC SERPL-MCNC: 48 MG/DL
HGB BLD-MCNC: 14 G/DL
IMM GRANULOCYTES NFR BLD AUTO: 0.3 %
KETONES URINE: NEGATIVE
LDLC SERPL CALC-MCNC: 105 MG/DL
LDLC SERPL DIRECT ASSAY-MCNC: 111 MG/DL
LEUKOCYTE ESTERASE URINE: NEGATIVE
LYMPHOCYTES # BLD AUTO: 2.48 K/UL
LYMPHOCYTES NFR BLD AUTO: 25 %
MAN DIFF?: NORMAL
MCHC RBC-ENTMCNC: 30.2 PG
MCHC RBC-ENTMCNC: 31.5 GM/DL
MCV RBC AUTO: 95.9 FL
MONOCYTES # BLD AUTO: 0.67 K/UL
MONOCYTES NFR BLD AUTO: 6.8 %
NEUTROPHILS # BLD AUTO: 6.5 K/UL
NEUTROPHILS NFR BLD AUTO: 65.5 %
NITRITE URINE: NEGATIVE
PH URINE: 6
PLATELET # BLD AUTO: 308 K/UL
POTASSIUM SERPL-SCNC: 4.4 MMOL/L
PROT SERPL-MCNC: 7.3 G/DL
PROTEIN URINE: NEGATIVE
RBC # BLD: 4.64 M/UL
RBC # FLD: 11.8 %
SODIUM SERPL-SCNC: 138 MMOL/L
SPECIFIC GRAVITY URINE: 1.02
T3 SERPL-MCNC: 103 NG/DL
T3RU NFR SERPL: 1.1 TBI
T4 FREE SERPL-MCNC: 1.3 NG/DL
TRIGL SERPL-MCNC: 45 MG/DL
TSH SERPL-ACNC: 1.14 UIU/ML
UROBILINOGEN URINE: NORMAL
WBC # FLD AUTO: 9.92 K/UL

## 2019-03-04 LAB — SAVE SPECIMEN: NORMAL

## 2019-03-06 ENCOUNTER — APPOINTMENT (OUTPATIENT)
Dept: INTERNAL MEDICINE | Facility: CLINIC | Age: 44
End: 2019-03-06
Payer: COMMERCIAL

## 2019-03-06 VITALS
HEIGHT: 60 IN | SYSTOLIC BLOOD PRESSURE: 110 MMHG | BODY MASS INDEX: 29.45 KG/M2 | DIASTOLIC BLOOD PRESSURE: 70 MMHG | WEIGHT: 150 LBS | TEMPERATURE: 97 F

## 2019-03-06 DIAGNOSIS — E04.9 NONTOXIC GOITER, UNSPECIFIED: ICD-10-CM

## 2019-03-06 DIAGNOSIS — R73.09 OTHER ABNORMAL GLUCOSE: ICD-10-CM

## 2019-03-06 PROCEDURE — 94010 BREATHING CAPACITY TEST: CPT

## 2019-03-06 PROCEDURE — 99396 PREV VISIT EST AGE 40-64: CPT | Mod: 25

## 2019-03-06 NOTE — HISTORY OF PRESENT ILLNESS
[FreeTextEntry1] : Patient presents today for CPE.\par  [de-identified] : Pt is perimenopausal l-still experiencing occasional hot flashes and palpitations.She had a recent cardiology evaluation with Dr. Lisker-had and Echo and 24 hour Holter study that were wnl.\par \par Overall, she is doing better, and anxiety symptoms have also improved.\par \par She complains of persistent GERD symptoms for the past 1-2 years-not getting better despite PPI therapy- Wants to consider doing EGD for further evaluation.  Her dentist is concerned given worsening erosion of her teeth enamel from acid reflux, etc.

## 2019-03-06 NOTE — PHYSICAL EXAM
[No Acute Distress] : no acute distress [Well Nourished] : well nourished [Well Developed] : well developed [Well-Appearing] : well-appearing [Normal Sclera/Conjunctiva] : normal sclera/conjunctiva [PERRL] : pupils equal round and reactive to light [EOMI] : extraocular movements intact [Normal Outer Ear/Nose] : the outer ears and nose were normal in appearance [Normal Oropharynx] : the oropharynx was normal [No JVD] : no jugular venous distention [Supple] : supple [No Lymphadenopathy] : no lymphadenopathy [No Respiratory Distress] : no respiratory distress  [Clear to Auscultation] : lungs were clear to auscultation bilaterally [No Accessory Muscle Use] : no accessory muscle use [Normal Rate] : normal rate  [Regular Rhythm] : with a regular rhythm [Normal S1, S2] : normal S1 and S2 [No Murmur] : no murmur heard [No Carotid Bruits] : no carotid bruits [No Abdominal Bruit] : a ~M bruit was not heard ~T in the abdomen [No Varicosities] : no varicosities [Pedal Pulses Present] : the pedal pulses are present [No Edema] : there was no peripheral edema [No Extremity Clubbing/Cyanosis] : no extremity clubbing/cyanosis [No Palpable Aorta] : no palpable aorta [Normal Appearance] : normal in appearance [No Axillary Lymphadenopathy] : no axillary lymphadenopathy [Soft] : abdomen soft [Non Tender] : non-tender [Non-distended] : non-distended [No Masses] : no abdominal mass palpated [No HSM] : no HSM [Normal Bowel Sounds] : normal bowel sounds [Normal Posterior Cervical Nodes] : no posterior cervical lymphadenopathy [Normal Anterior Cervical Nodes] : no anterior cervical lymphadenopathy [No CVA Tenderness] : no CVA  tenderness [No Spinal Tenderness] : no spinal tenderness [No Joint Swelling] : no joint swelling [Grossly Normal Strength/Tone] : grossly normal strength/tone [No Rash] : no rash [Normal Gait] : normal gait [Coordination Grossly Intact] : coordination grossly intact [No Focal Deficits] : no focal deficits [Deep Tendon Reflexes (DTR)] : deep tendon reflexes were 2+ and symmetric [Normal Affect] : the affect was normal [Normal Insight/Judgement] : insight and judgment were intact [de-identified] : mildly ? enlarged thyroid with no specific deflined thyroid nodule or mass

## 2019-03-06 NOTE — HEALTH RISK ASSESSMENT
[Patient reported mammogram was normal] : Patient reported mammogram was normal [Patient reported PAP Smear was normal] : Patient reported PAP Smear was normal [MammogramDate] : 04/18 [MammogramComments] : at [facility] with sono [PapSmearDate] : 02/18 [PapSmearComments] : with Dr. Danielle-has an appt for next Monday

## 2019-03-06 NOTE — DATA REVIEWED
[FreeTextEntry1] : pt declined EKG--had EKG with cardiologist recently\par PFT from today revealed normal spirometry\par

## 2019-04-15 ENCOUNTER — APPOINTMENT (OUTPATIENT)
Dept: GASTROENTEROLOGY | Facility: CLINIC | Age: 44
End: 2019-04-15
Payer: COMMERCIAL

## 2019-04-15 ENCOUNTER — LABORATORY RESULT (OUTPATIENT)
Age: 44
End: 2019-04-15

## 2019-04-15 LAB
HCG UR QL: NEGATIVE
QUALITY CONTROL: YES

## 2019-04-15 PROCEDURE — A4550 SURGICAL TRAYS: CPT

## 2019-04-15 PROCEDURE — 81025 URINE PREGNANCY TEST: CPT

## 2019-04-15 PROCEDURE — 43239 EGD BIOPSY SINGLE/MULTIPLE: CPT

## 2019-07-10 ENCOUNTER — MEDICATION RENEWAL (OUTPATIENT)
Age: 44
End: 2019-07-10

## 2019-08-01 ENCOUNTER — MOBILE ON CALL (OUTPATIENT)
Age: 44
End: 2019-08-01

## 2019-08-01 ENCOUNTER — MEDICATION RENEWAL (OUTPATIENT)
Age: 44
End: 2019-08-01

## 2019-08-07 ENCOUNTER — APPOINTMENT (OUTPATIENT)
Dept: INTERNAL MEDICINE | Facility: CLINIC | Age: 44
End: 2019-08-07
Payer: COMMERCIAL

## 2019-08-07 VITALS
HEIGHT: 60 IN | BODY MASS INDEX: 29.84 KG/M2 | SYSTOLIC BLOOD PRESSURE: 110 MMHG | DIASTOLIC BLOOD PRESSURE: 70 MMHG | WEIGHT: 152 LBS | TEMPERATURE: 97.4 F

## 2019-08-07 DIAGNOSIS — Z87.42 PERSONAL HISTORY OF OTHER DISEASES OF THE FEMALE GENITAL TRACT: ICD-10-CM

## 2019-08-07 DIAGNOSIS — L90.0 LICHEN SCLEROSUS ET ATROPHICUS: ICD-10-CM

## 2019-08-07 PROCEDURE — 99214 OFFICE O/P EST MOD 30 MIN: CPT

## 2019-08-08 NOTE — HISTORY OF PRESENT ILLNESS
[FreeTextEntry1] : Patient presents for follow up evaluation for multiple medical concerns including:\par new onset perimenopause  fatigue, chronic insomnia, mild increased anxiety/ occasional palpitations\par  [de-identified] : Pt is perimenopausal l-still experiencing occasional hot flashes and palpitations.She had a recent cardiology evaluation with Dr. Lisker-had and Echo and 24 hour Holter study that were wnl.\par \par Overall, she is doing better, and anxiety symptoms have also improved.\par She also started a new position and is much happier now at work.\par \par She gets occasional flare ups of her lichen sclerosis-responds to clobetasol ointment but it can often cause yeast infection/vaginitis.\par \par She complains of persistent GERD symptoms for the past 1-2 years-not getting better despite PPI therapy- Wants to consider doing EGD for further evaluation.  Her dentist is concerned given worsening erosion of her teeth enamel from acid reflux, etc.

## 2019-08-08 NOTE — PHYSICAL EXAM
[No Acute Distress] : no acute distress [Well Nourished] : well nourished [Well Developed] : well developed [Well-Appearing] : well-appearing [Normal Sclera/Conjunctiva] : normal sclera/conjunctiva [PERRL] : pupils equal round and reactive to light [EOMI] : extraocular movements intact [Normal Outer Ear/Nose] : the outer ears and nose were normal in appearance [Normal Oropharynx] : the oropharynx was normal [No JVD] : no jugular venous distention [No Lymphadenopathy] : no lymphadenopathy [Supple] : supple [Thyroid Normal, No Nodules] : the thyroid was normal and there were no nodules present [No Respiratory Distress] : no respiratory distress  [No Accessory Muscle Use] : no accessory muscle use [Clear to Auscultation] : lungs were clear to auscultation bilaterally [Normal Rate] : normal rate  [Regular Rhythm] : with a regular rhythm [Normal S1, S2] : normal S1 and S2 [No Murmur] : no murmur heard [No Carotid Bruits] : no carotid bruits [No Abdominal Bruit] : a ~M bruit was not heard ~T in the abdomen [No Varicosities] : no varicosities [Pedal Pulses Present] : the pedal pulses are present [No Edema] : there was no peripheral edema [No Palpable Aorta] : no palpable aorta [No Extremity Clubbing/Cyanosis] : no extremity clubbing/cyanosis [Soft] : abdomen soft [Non Tender] : non-tender [Non-distended] : non-distended [No HSM] : no HSM [No Masses] : no abdominal mass palpated [Normal Bowel Sounds] : normal bowel sounds [Normal Posterior Cervical Nodes] : no posterior cervical lymphadenopathy [Normal Anterior Cervical Nodes] : no anterior cervical lymphadenopathy [No CVA Tenderness] : no CVA  tenderness [No Spinal Tenderness] : no spinal tenderness [No Joint Swelling] : no joint swelling [Grossly Normal Strength/Tone] : grossly normal strength/tone [Coordination Grossly Intact] : coordination grossly intact [No Rash] : no rash [No Focal Deficits] : no focal deficits [Normal Gait] : normal gait [Normal Affect] : the affect was normal [Deep Tendon Reflexes (DTR)] : deep tendon reflexes were 2+ and symmetric [Normal Insight/Judgement] : insight and judgment were intact

## 2019-10-02 PROBLEM — Z60.2 PERSON LIVING ALONE: Status: ACTIVE | Noted: 2018-08-02

## 2019-11-06 ENCOUNTER — MEDICATION RENEWAL (OUTPATIENT)
Age: 44
End: 2019-11-06

## 2020-03-03 ENCOUNTER — LABORATORY RESULT (OUTPATIENT)
Age: 45
End: 2020-03-03

## 2020-03-04 ENCOUNTER — APPOINTMENT (OUTPATIENT)
Dept: INTERNAL MEDICINE | Facility: CLINIC | Age: 45
End: 2020-03-04
Payer: COMMERCIAL

## 2020-03-04 PROCEDURE — 36415 COLL VENOUS BLD VENIPUNCTURE: CPT

## 2020-03-05 LAB
25(OH)D3 SERPL-MCNC: 42.7 NG/ML
ALBUMIN SERPL ELPH-MCNC: 4.5 G/DL
ALP BLD-CCNC: 66 U/L
ALT SERPL-CCNC: 22 U/L
ANION GAP SERPL CALC-SCNC: 13 MMOL/L
APPEARANCE: CLEAR
AST SERPL-CCNC: 20 U/L
BASOPHILS # BLD AUTO: 0.05 K/UL
BASOPHILS NFR BLD AUTO: 0.7 %
BILIRUB SERPL-MCNC: 0.2 MG/DL
BILIRUBIN URINE: NEGATIVE
BLOOD URINE: NEGATIVE
BUN SERPL-MCNC: 9 MG/DL
CALCIUM SERPL-MCNC: 9.7 MG/DL
CHLORIDE SERPL-SCNC: 105 MMOL/L
CHOLEST SERPL-MCNC: 153 MG/DL
CHOLEST/HDLC SERPL: 3.2 RATIO
CO2 SERPL-SCNC: 23 MMOL/L
COLOR: NORMAL
CREAT SERPL-MCNC: 0.64 MG/DL
EOSINOPHIL # BLD AUTO: 0.21 K/UL
EOSINOPHIL NFR BLD AUTO: 2.9 %
ERYTHROCYTE [SEDIMENTATION RATE] IN BLOOD BY WESTERGREN METHOD: 4 MM/HR
ESTIMATED AVERAGE GLUCOSE: 108 MG/DL
GLUCOSE QUALITATIVE U: NEGATIVE
GLUCOSE SERPL-MCNC: 104 MG/DL
HBA1C MFR BLD HPLC: 5.4 %
HCT VFR BLD CALC: 44.3 %
HDLC SERPL-MCNC: 48 MG/DL
HGB BLD-MCNC: 14.5 G/DL
IMM GRANULOCYTES NFR BLD AUTO: 0.4 %
KETONES URINE: NEGATIVE
LDLC SERPL CALC-MCNC: 97 MG/DL
LDLC SERPL DIRECT ASSAY-MCNC: 104 MG/DL
LEUKOCYTE ESTERASE URINE: NEGATIVE
LYMPHOCYTES # BLD AUTO: 2.76 K/UL
LYMPHOCYTES NFR BLD AUTO: 38 %
MAN DIFF?: NORMAL
MCHC RBC-ENTMCNC: 31.4 PG
MCHC RBC-ENTMCNC: 32.7 GM/DL
MCV RBC AUTO: 95.9 FL
MONOCYTES # BLD AUTO: 0.53 K/UL
MONOCYTES NFR BLD AUTO: 7.3 %
NEUTROPHILS # BLD AUTO: 3.68 K/UL
NEUTROPHILS NFR BLD AUTO: 50.7 %
NITRITE URINE: NEGATIVE
PH URINE: 6.5
PLATELET # BLD AUTO: 373 K/UL
POTASSIUM SERPL-SCNC: 4.5 MMOL/L
PROT SERPL-MCNC: 7.8 G/DL
PROTEIN URINE: NEGATIVE
RBC # BLD: 4.62 M/UL
RBC # FLD: 11.5 %
SAVE SPECIMEN: NORMAL
SODIUM SERPL-SCNC: 141 MMOL/L
SPECIFIC GRAVITY URINE: 1.02
T3 SERPL-MCNC: 100 NG/DL
T3RU NFR SERPL: 1 TBI
T4 FREE SERPL-MCNC: 1.3 NG/DL
TRIGL SERPL-MCNC: 43 MG/DL
TSH SERPL-ACNC: 1.01 UIU/ML
UROBILINOGEN URINE: NORMAL
WBC # FLD AUTO: 7.26 K/UL

## 2020-03-11 ENCOUNTER — APPOINTMENT (OUTPATIENT)
Dept: INTERNAL MEDICINE | Facility: CLINIC | Age: 45
End: 2020-03-11
Payer: COMMERCIAL

## 2020-03-11 VITALS
DIASTOLIC BLOOD PRESSURE: 70 MMHG | SYSTOLIC BLOOD PRESSURE: 120 MMHG | BODY MASS INDEX: 30.82 KG/M2 | TEMPERATURE: 97.4 F | HEIGHT: 60 IN | WEIGHT: 157 LBS

## 2020-03-11 PROCEDURE — 99396 PREV VISIT EST AGE 40-64: CPT | Mod: 25

## 2020-03-11 PROCEDURE — 94010 BREATHING CAPACITY TEST: CPT

## 2020-03-11 PROCEDURE — 93000 ELECTROCARDIOGRAM COMPLETE: CPT

## 2020-03-11 RX ORDER — TERCONAZOLE 8 MG/G
0.8 CREAM VAGINAL
Qty: 1 | Refills: 3 | Status: COMPLETED | COMMUNITY
Start: 2019-08-07 | End: 2020-03-11

## 2020-03-11 NOTE — HEALTH RISK ASSESSMENT
[Good] : ~his/her~  mood as  good [Yes] : Yes [Monthly or less (1 pt)] : Monthly or less (1 point) [1 or 2 (0 pts)] : 1 or 2 (0 points) [Never (0 pts)] : Never (0 points) [No] : In the past 12 months have you used drugs other than those required for medical reasons? No [Patient reported mammogram was normal] : Patient reported mammogram was normal [Patient reported PAP Smear was normal] : Patient reported PAP Smear was normal [No falls in past year] : Patient reported no falls in the past year [0] : 2) Feeling down, depressed, or hopeless: Not at all (0) [] : No [de-identified] : none [de-identified] : saw pulmonologist Dr. Haynes for asthma last year [Audit-CScore] : 1 [de-identified] : no routine exercise since she started new job in 7/2019 [de-identified] : no restrictions [[id number]] : 0 [MammogramDate] : 04/18 [MammogramComments] : at [facility] [PapSmearDate] : 03/19 [PapSmearComments] : with Dr. Danielle

## 2020-03-11 NOTE — HISTORY OF PRESENT ILLNESS
[FreeTextEntry1] : Pt. presents for a comprehensive evaluation for multiple medical issues.\par  [de-identified] : Pt is perimenopausal -still experiencing occasional hot flashes and palpitations.\par \par She had a recent cardiology evaluation with Dr. Lisker-had and Echo and 24 hour Holter study that were wnl last year.\par \par Overall, she is doing better, and anxiety symptoms have also improved.\par She also started a new position in 7/2019 and is much happier now at work-although she is working longer hours and often goes in on Saturdays.\par \par She gets occasional flare ups of her lichen sclerosis-responds to clobetasol ointment but it can often cause yeast infection/vaginitis.\par \par She complains of persistent GERD symptoms for the past 1-2 years-not getting better despite PPI therapy- She had EGD for further evaluation last year.

## 2020-03-11 NOTE — PHYSICAL EXAM
[No Acute Distress] : no acute distress [Well Nourished] : well nourished [Well Developed] : well developed [Well-Appearing] : well-appearing [Normal Sclera/Conjunctiva] : normal sclera/conjunctiva [PERRL] : pupils equal round and reactive to light [EOMI] : extraocular movements intact [Normal Outer Ear/Nose] : the outer ears and nose were normal in appearance [Normal Oropharynx] : the oropharynx was normal [No JVD] : no jugular venous distention [No Lymphadenopathy] : no lymphadenopathy [Supple] : supple [Thyroid Normal, No Nodules] : the thyroid was normal and there were no nodules present [No Respiratory Distress] : no respiratory distress  [No Accessory Muscle Use] : no accessory muscle use [Clear to Auscultation] : lungs were clear to auscultation bilaterally [Normal Rate] : normal rate  [Regular Rhythm] : with a regular rhythm [Normal S1, S2] : normal S1 and S2 [No Murmur] : no murmur heard [No Carotid Bruits] : no carotid bruits [No Abdominal Bruit] : a ~M bruit was not heard ~T in the abdomen [No Varicosities] : no varicosities [Pedal Pulses Present] : the pedal pulses are present [No Edema] : there was no peripheral edema [No Palpable Aorta] : no palpable aorta [No Extremity Clubbing/Cyanosis] : no extremity clubbing/cyanosis [Normal Appearance] : normal in appearance [No Axillary Lymphadenopathy] : no axillary lymphadenopathy [Soft] : abdomen soft [Non Tender] : non-tender [Non-distended] : non-distended [No Masses] : no abdominal mass palpated [No HSM] : no HSM [Normal Bowel Sounds] : normal bowel sounds [Normal Posterior Cervical Nodes] : no posterior cervical lymphadenopathy [Normal Anterior Cervical Nodes] : no anterior cervical lymphadenopathy [No CVA Tenderness] : no CVA  tenderness [No Spinal Tenderness] : no spinal tenderness [No Joint Swelling] : no joint swelling [Grossly Normal Strength/Tone] : grossly normal strength/tone [No Rash] : no rash [Coordination Grossly Intact] : coordination grossly intact [No Focal Deficits] : no focal deficits [Normal Gait] : normal gait [Deep Tendon Reflexes (DTR)] : deep tendon reflexes were 2+ and symmetric [Normal Affect] : the affect was normal [Normal Insight/Judgement] : insight and judgment were intact [de-identified] : dense fibrocystic breasts

## 2020-03-11 NOTE — REVIEW OF SYSTEMS
[Negative] : Heme/Lymph [Recent Change In Weight] : ~T recent weight change [FreeTextEntry2] : recent weight gain

## 2020-04-29 ENCOUNTER — APPOINTMENT (OUTPATIENT)
Dept: INTERNAL MEDICINE | Facility: CLINIC | Age: 45
End: 2020-04-29
Payer: COMMERCIAL

## 2020-04-29 DIAGNOSIS — R68.89 OTHER GENERAL SYMPTOMS AND SIGNS: ICD-10-CM

## 2020-04-29 PROCEDURE — 99442: CPT

## 2020-05-05 ENCOUNTER — NON-APPOINTMENT (OUTPATIENT)
Age: 45
End: 2020-05-05

## 2020-05-07 ENCOUNTER — LABORATORY RESULT (OUTPATIENT)
Age: 45
End: 2020-05-07

## 2020-05-21 ENCOUNTER — APPOINTMENT (OUTPATIENT)
Dept: INTERNAL MEDICINE | Facility: CLINIC | Age: 45
End: 2020-05-21
Payer: COMMERCIAL

## 2020-05-21 ENCOUNTER — NON-APPOINTMENT (OUTPATIENT)
Age: 45
End: 2020-05-21

## 2020-05-21 PROCEDURE — 99442: CPT

## 2020-07-16 ENCOUNTER — APPOINTMENT (OUTPATIENT)
Dept: INTERNAL MEDICINE | Facility: CLINIC | Age: 45
End: 2020-07-16
Payer: COMMERCIAL

## 2020-07-16 VITALS
WEIGHT: 147 LBS | BODY MASS INDEX: 28.86 KG/M2 | SYSTOLIC BLOOD PRESSURE: 110 MMHG | DIASTOLIC BLOOD PRESSURE: 70 MMHG | HEIGHT: 60 IN

## 2020-07-16 VITALS — TEMPERATURE: 97.6 F

## 2020-07-16 DIAGNOSIS — Z11.59 ENCOUNTER FOR SCREENING FOR OTHER VIRAL DISEASES: ICD-10-CM

## 2020-07-16 PROCEDURE — 36415 COLL VENOUS BLD VENIPUNCTURE: CPT

## 2020-07-16 PROCEDURE — 99214 OFFICE O/P EST MOD 30 MIN: CPT | Mod: 25

## 2020-07-16 NOTE — HISTORY OF PRESENT ILLNESS
[FreeTextEntry1] : Patient presents for follow up evaluation for multiple medical concerns including:\par new onset perimenopause  fatigue, chronic insomnia, mild increased anxiety/ occasional palpitations\par  [de-identified] : Pt is perimenopausal -still experiencing occasional hot flashes / night sweats and palpitations.\par Recently her overall anxiety symptoms are getting worse.  She notes increased emotional lability and is not sure if it is due to dolly-menopause or if its due to stress/anxiety. She has been taking Xanax prn ( no more than 2mg per day).\par Xanax minimally helps her sleep.  Tylenol PM and OTC sleep aids give her bad dreams and does not gve her restful sleep.\par \par Her LMP was 49 days ago.  Her cycles are less frequent and can vary from 60-90 days/ she rarely has a 28 -35 day cycle.\par She saw Dr. Liana Danielle 1 year ago and has an annual visit with her in 9/2020.\par \par She had a cardiology evaluation with Dr. Lisker-[place] and Echo and 24 hour Holter study that were wnl last year in 1/2019.\par \par

## 2020-07-16 NOTE — ASSESSMENT
[FreeTextEntry1] : I suggest first a trial of Ambien 10 mg prn sleep for the next week to see if she feels better once she gets regular sleep.\par \par After 1 week and stable on Ambien 10mg qhs, can start a trial of Lexapro 10mg (start with 1/2 tablet qd and increase as tolerated.)

## 2020-07-16 NOTE — PHYSICAL EXAM
[Well Nourished] : well nourished [No Acute Distress] : no acute distress [Well-Appearing] : well-appearing [Well Developed] : well developed [Normal Sclera/Conjunctiva] : normal sclera/conjunctiva [PERRL] : pupils equal round and reactive to light [EOMI] : extraocular movements intact [Normal Oropharynx] : the oropharynx was normal [Normal Outer Ear/Nose] : the outer ears and nose were normal in appearance [No JVD] : no jugular venous distention [No Lymphadenopathy] : no lymphadenopathy [Supple] : supple [Thyroid Normal, No Nodules] : the thyroid was normal and there were no nodules present [No Accessory Muscle Use] : no accessory muscle use [No Respiratory Distress] : no respiratory distress  [Clear to Auscultation] : lungs were clear to auscultation bilaterally [Normal Rate] : normal rate  [Regular Rhythm] : with a regular rhythm [No Murmur] : no murmur heard [Normal S1, S2] : normal S1 and S2 [No Carotid Bruits] : no carotid bruits [No Varicosities] : no varicosities [Pedal Pulses Present] : the pedal pulses are present [No Abdominal Bruit] : a ~M bruit was not heard ~T in the abdomen [No Palpable Aorta] : no palpable aorta [No Edema] : there was no peripheral edema [Soft] : abdomen soft [No Extremity Clubbing/Cyanosis] : no extremity clubbing/cyanosis [Non Tender] : non-tender [Non-distended] : non-distended [No Masses] : no abdominal mass palpated [No HSM] : no HSM [Normal Posterior Cervical Nodes] : no posterior cervical lymphadenopathy [Normal Bowel Sounds] : normal bowel sounds [No CVA Tenderness] : no CVA  tenderness [Normal Anterior Cervical Nodes] : no anterior cervical lymphadenopathy [No Joint Swelling] : no joint swelling [No Spinal Tenderness] : no spinal tenderness [Grossly Normal Strength/Tone] : grossly normal strength/tone [No Rash] : no rash [Coordination Grossly Intact] : coordination grossly intact [Normal Gait] : normal gait [No Focal Deficits] : no focal deficits [Normal Insight/Judgement] : insight and judgment were intact [Normal Affect] : the affect was normal [Deep Tendon Reflexes (DTR)] : deep tendon reflexes were 2+ and symmetric

## 2020-07-16 NOTE — REVIEW OF SYSTEMS
[Hot Flashes] : hot flashes [Insomnia] : insomnia [Anxiety] : anxiety [Night Sweats] : night sweats [Negative] : Heme/Lymph [FreeTextEntry2] : lost 10 lbs in the past 3 months

## 2020-07-17 ENCOUNTER — NON-APPOINTMENT (OUTPATIENT)
Age: 45
End: 2020-07-17

## 2020-07-18 LAB
ESTRADIOL SERPL-MCNC: 13 PG/ML
FSH SERPL-MCNC: 79.2 IU/L
LH SERPL-ACNC: 42.8 IU/L
SARS-COV-2 IGG SERPL IA-ACNC: <0.1 INDEX
SARS-COV-2 IGG SERPL QL IA: NEGATIVE
SAVE SPECIMEN: NORMAL
TSH SERPL-ACNC: 0.85 UIU/ML

## 2020-12-02 ENCOUNTER — APPOINTMENT (OUTPATIENT)
Dept: INTERNAL MEDICINE | Facility: CLINIC | Age: 45
End: 2020-12-02
Payer: COMMERCIAL

## 2020-12-02 PROCEDURE — 99214 OFFICE O/P EST MOD 30 MIN: CPT | Mod: 95

## 2020-12-02 RX ORDER — ZOLPIDEM TARTRATE 10 MG/1
10 TABLET ORAL
Qty: 30 | Refills: 5 | Status: COMPLETED | COMMUNITY
Start: 2020-04-01 | End: 2020-12-02

## 2020-12-02 RX ORDER — METRONIDAZOLE 7.5 MG/G
0.75 GEL VAGINAL
Qty: 1 | Refills: 3 | Status: COMPLETED | COMMUNITY
Start: 2017-01-03 | End: 2020-12-02

## 2020-12-08 NOTE — HISTORY OF PRESENT ILLNESS
[FreeTextEntry1] : Patient presents for follow up evaluation for multiple medical concerns including:\par new onset perimenopause  fatigue, chronic insomnia, mild increased anxiety/ occasional palpitations\par  [de-identified] : Pt is perimenopausal -still experiencing occasional hot flashes / night sweats and palpitations.\par Recently her overall anxiety symptoms are getting worse.  She notes increased emotional lability and is not sure if it is due to dolly-menopause or if its due to stress/anxiety. She has been taking Xanax prn ( no more than 2mg per day).\par Xanax minimally helps her sleep.  Tylenol PM and OTC sleep aids give her bad dreams and does not gve her restful sleep.\par \par Her LMP was 49 days ago.  Her cycles are less frequent and can vary from 60-90 days/ she rarely has a 28 -35 day cycle.\par She saw Dr. Liana Danielle 1 year ago and has an annual visit with her in 9/2020.\par \par She had a cardiology evaluation with Dr. Lisker-[place] and Echo and 24 hour Holter study that were wnl last year in 1/2019.\par \par she reports symptoms of recurrent sinustis with bilateral maxillary pain and pressure and post nasal drip and occasional cough.\par \par

## 2020-12-08 NOTE — REVIEW OF SYSTEMS
[Nasal Discharge] : nasal discharge [Postnasal Drip] : postnasal drip [Cough] : cough [Fever] : no fever [Chills] : no chills

## 2020-12-15 PROBLEM — N76.0 BACTERIAL VAGINOSIS: Status: RESOLVED | Noted: 2017-05-03 | Resolved: 2020-12-15

## 2020-12-31 ENCOUNTER — APPOINTMENT (OUTPATIENT)
Dept: INTERNAL MEDICINE | Facility: CLINIC | Age: 45
End: 2020-12-31
Payer: COMMERCIAL

## 2020-12-31 DIAGNOSIS — J45.909 UNSPECIFIED ASTHMA, UNCOMPLICATED: ICD-10-CM

## 2020-12-31 PROCEDURE — 99213 OFFICE O/P EST LOW 20 MIN: CPT | Mod: 95

## 2020-12-31 NOTE — HISTORY OF PRESENT ILLNESS
[FreeTextEntry8] : Patient presented today for a TELEHEALTH visit.\par \par Pt presents complaining of recurrent sinus congestion for 4-5 days that progressed to new onset chest congestion/cough and wheezing.\par 5 days ago-developed usual sinus infection symptoms with "head cold" - developed headaches with bilateral sinus pressure and congestion with and severe post nasal drip.  Over the past 2 days the congestion went to her chest and she is coughing up green/grey sputum and mild wheezing (she is using her rescue inhaler prn)-no SOB. She had low grade fever to 100 2 days ago.  She went for COVID PCR test 12/30/20--was negative.

## 2020-12-31 NOTE — REVIEW OF SYSTEMS
[Fever] : fever [Nasal Discharge] : nasal discharge [Postnasal Drip] : postnasal drip [Wheezing] : wheezing [Cough] : cough

## 2020-12-31 NOTE — PHYSICAL EXAM
[No Acute Distress] : no acute distress [Well Nourished] : well nourished [Well Developed] : well developed [Well-Appearing] : well-appearing [Thyroid Normal, No Nodules] : the thyroid was normal and there were no nodules present [No Masses] : no abdominal mass palpated [Normal Anterior Cervical Nodes] : no anterior cervical lymphadenopathy [Coordination Grossly Intact] : coordination grossly intact [No Focal Deficits] : no focal deficits [Normal Gait] : normal gait [Normal Affect] : the affect was normal [Normal Insight/Judgement] : insight and judgment were intact [Normal] : affect was normal and insight and judgment were intact

## 2021-03-08 ENCOUNTER — APPOINTMENT (OUTPATIENT)
Dept: INTERNAL MEDICINE | Facility: CLINIC | Age: 46
End: 2021-03-08
Payer: COMMERCIAL

## 2021-03-08 ENCOUNTER — LABORATORY RESULT (OUTPATIENT)
Age: 46
End: 2021-03-08

## 2021-03-08 PROCEDURE — 36415 COLL VENOUS BLD VENIPUNCTURE: CPT

## 2021-03-08 PROCEDURE — 99072 ADDL SUPL MATRL&STAF TM PHE: CPT

## 2021-03-09 LAB
25(OH)D3 SERPL-MCNC: 42.1 NG/ML
ALBUMIN SERPL ELPH-MCNC: 4.6 G/DL
ALP BLD-CCNC: 66 U/L
ALT SERPL-CCNC: 14 U/L
ANION GAP SERPL CALC-SCNC: 11 MMOL/L
APPEARANCE: CLEAR
AST SERPL-CCNC: 16 U/L
BASOPHILS # BLD AUTO: 0.04 K/UL
BASOPHILS NFR BLD AUTO: 0.6 %
BILIRUB SERPL-MCNC: 0.4 MG/DL
BILIRUBIN URINE: NEGATIVE
BLOOD URINE: NEGATIVE
BUN SERPL-MCNC: 10 MG/DL
CALCIUM SERPL-MCNC: 10 MG/DL
CHLORIDE SERPL-SCNC: 106 MMOL/L
CHOLEST SERPL-MCNC: 174 MG/DL
CO2 SERPL-SCNC: 24 MMOL/L
COLOR: NORMAL
CREAT SERPL-MCNC: 0.65 MG/DL
EOSINOPHIL # BLD AUTO: 0.32 K/UL
EOSINOPHIL NFR BLD AUTO: 4.8 %
ERYTHROCYTE [SEDIMENTATION RATE] IN BLOOD BY WESTERGREN METHOD: 10 MM/HR
ESTIMATED AVERAGE GLUCOSE: 105 MG/DL
GLUCOSE QUALITATIVE U: NEGATIVE
GLUCOSE SERPL-MCNC: 89 MG/DL
HBA1C MFR BLD HPLC: 5.3 %
HCT VFR BLD CALC: 44.3 %
HDLC SERPL-MCNC: 53 MG/DL
HGB BLD-MCNC: 14.5 G/DL
IMM GRANULOCYTES NFR BLD AUTO: 0.3 %
KETONES URINE: NEGATIVE
LDLC SERPL CALC-MCNC: 110 MG/DL
LDLC SERPL DIRECT ASSAY-MCNC: 112 MG/DL
LEUKOCYTE ESTERASE URINE: NEGATIVE
LYMPHOCYTES # BLD AUTO: 3.08 K/UL
LYMPHOCYTES NFR BLD AUTO: 46.3 %
MAN DIFF?: NORMAL
MCHC RBC-ENTMCNC: 31.5 PG
MCHC RBC-ENTMCNC: 32.7 GM/DL
MCV RBC AUTO: 96.3 FL
MONOCYTES # BLD AUTO: 0.45 K/UL
MONOCYTES NFR BLD AUTO: 6.8 %
NEUTROPHILS # BLD AUTO: 2.74 K/UL
NEUTROPHILS NFR BLD AUTO: 41.2 %
NITRITE URINE: NEGATIVE
NONHDLC SERPL-MCNC: 122 MG/DL
PH URINE: 7
PLATELET # BLD AUTO: 317 K/UL
POTASSIUM SERPL-SCNC: 3.9 MMOL/L
PROT SERPL-MCNC: 7.6 G/DL
PROTEIN URINE: NEGATIVE
RBC # BLD: 4.6 M/UL
RBC # FLD: 11.7 %
SAVE SPECIMEN: NORMAL
SODIUM SERPL-SCNC: 141 MMOL/L
SPECIFIC GRAVITY URINE: 1.02
T3 SERPL-MCNC: 96 NG/DL
T3RU NFR SERPL: 1 TBI
T4 FREE SERPL-MCNC: 1.3 NG/DL
TRIGL SERPL-MCNC: 60 MG/DL
TSH SERPL-ACNC: 1.5 UIU/ML
UROBILINOGEN URINE: NORMAL
WBC # FLD AUTO: 6.65 K/UL

## 2021-03-15 ENCOUNTER — NON-APPOINTMENT (OUTPATIENT)
Age: 46
End: 2021-03-15

## 2021-03-15 ENCOUNTER — APPOINTMENT (OUTPATIENT)
Dept: INTERNAL MEDICINE | Facility: CLINIC | Age: 46
End: 2021-03-15
Payer: COMMERCIAL

## 2021-03-15 VITALS
SYSTOLIC BLOOD PRESSURE: 110 MMHG | TEMPERATURE: 97.8 F | DIASTOLIC BLOOD PRESSURE: 70 MMHG | BODY MASS INDEX: 26.5 KG/M2 | HEIGHT: 60 IN | WEIGHT: 135 LBS

## 2021-03-15 PROCEDURE — 99072 ADDL SUPL MATRL&STAF TM PHE: CPT

## 2021-03-15 PROCEDURE — 93000 ELECTROCARDIOGRAM COMPLETE: CPT

## 2021-03-15 PROCEDURE — 99396 PREV VISIT EST AGE 40-64: CPT | Mod: 25

## 2021-03-15 RX ORDER — CLARITHROMYCIN 500 MG/1
500 TABLET, FILM COATED, EXTENDED RELEASE ORAL
Qty: 20 | Refills: 0 | Status: COMPLETED | COMMUNITY
Start: 2020-12-31 | End: 2021-03-15

## 2021-03-15 RX ORDER — PREDNISONE 20 MG/1
20 TABLET ORAL
Qty: 20 | Refills: 0 | Status: COMPLETED | COMMUNITY
Start: 2020-12-31 | End: 2021-03-15

## 2021-03-15 NOTE — PHYSICAL EXAM
[No Acute Distress] : no acute distress [Well Nourished] : well nourished [Well Developed] : well developed [Well-Appearing] : well-appearing [Normal Sclera/Conjunctiva] : normal sclera/conjunctiva [PERRL] : pupils equal round and reactive to light [EOMI] : extraocular movements intact [Normal Outer Ear/Nose] : the outer ears and nose were normal in appearance [No JVD] : no jugular venous distention [No Lymphadenopathy] : no lymphadenopathy [Supple] : supple [Thyroid Normal, No Nodules] : the thyroid was normal and there were no nodules present [No Respiratory Distress] : no respiratory distress  [No Accessory Muscle Use] : no accessory muscle use [Clear to Auscultation] : lungs were clear to auscultation bilaterally [Normal Rate] : normal rate  [Regular Rhythm] : with a regular rhythm [Normal S1, S2] : normal S1 and S2 [No Murmur] : no murmur heard [No Carotid Bruits] : no carotid bruits [No Abdominal Bruit] : a ~M bruit was not heard ~T in the abdomen [No Varicosities] : no varicosities [Pedal Pulses Present] : the pedal pulses are present [No Edema] : there was no peripheral edema [No Palpable Aorta] : no palpable aorta [No Extremity Clubbing/Cyanosis] : no extremity clubbing/cyanosis [Normal Appearance] : normal in appearance [No Axillary Lymphadenopathy] : no axillary lymphadenopathy [Soft] : abdomen soft [Non Tender] : non-tender [Non-distended] : non-distended [No Masses] : no abdominal mass palpated [No HSM] : no HSM [Normal Bowel Sounds] : normal bowel sounds [Normal Posterior Cervical Nodes] : no posterior cervical lymphadenopathy [Normal Anterior Cervical Nodes] : no anterior cervical lymphadenopathy [No CVA Tenderness] : no CVA  tenderness [No Spinal Tenderness] : no spinal tenderness [No Joint Swelling] : no joint swelling [Grossly Normal Strength/Tone] : grossly normal strength/tone [No Rash] : no rash [Coordination Grossly Intact] : coordination grossly intact [No Focal Deficits] : no focal deficits [Normal Gait] : normal gait [Deep Tendon Reflexes (DTR)] : deep tendon reflexes were 2+ and symmetric [Normal Affect] : the affect was normal [Normal Insight/Judgement] : insight and judgment were intact [Normal] : affect was normal and insight and judgment were intact

## 2021-03-16 NOTE — REVIEW OF SYSTEMS
[Negative] : Heme/Lymph [Hot Flashes] : hot flashes [Recent Change In Weight] : ~T recent weight change [Insomnia] : insomnia [Anxiety] : anxiety [Depression] : depression [FreeTextEntry2] : weight loss

## 2021-03-16 NOTE — HEALTH RISK ASSESSMENT
[Patient reported mammogram was normal] : Patient reported mammogram was normal [Patient reported PAP Smear was normal] : Patient reported PAP Smear was normal [HIV test declined] : HIV test declined [Hepatitis C test declined] : Hepatitis C test declined [Good] : ~his/her~  mood as  good [Never (0 pts)] : Never (0 points) [No] : In the past 12 months have you used drugs other than those required for medical reasons? No [2 - 4 times a month (2 pts)] : 2-4 times a month (2 points) [0] : 1) Little interest or pleasure doing things: Not at all (0) [1] : 2) Feeling down, depressed, or hopeless for several days (1) [] : No [de-identified] : started seeing therapist Dr. Garcia weekly--started 4 months ago [de-identified] : socially 2 times a month [Audit-CScore] : 0 [de-identified] : exercising more-walks daily [de-identified] : overall healthy balanced and varied diet without any exclusions  [FreeTextEntry1] : pt started seeing a therapist weekly [RKH5Hcddt] : 1 [MammogramDate] : 07/20 [MammogramComments] : at [facility] [PapSmearDate] : 09/20 [PapSmearComments] : with Dr. Danielle- LMP was 2/03/2021 -cycles are irregular

## 2021-03-16 NOTE — HISTORY OF PRESENT ILLNESS
[FreeTextEntry1] : Pt. presents for a comprehensive evaluation for multiple medical issues.\par  [de-identified] : Patient reports she has been in overall good physical health. She lost weight with healthier diet and increased walking/physical activity since last year.\par However, she reports increased anxiety and mild depression due to stress of the pandemic and stress at work and stress of caring for her mother who suffered multiple medical issues requiring 2 surgeries in the past year.  (Her mom required bladder sling surgery and total knee replacement surgery recently). 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

## 2021-04-22 ENCOUNTER — APPOINTMENT (OUTPATIENT)
Dept: INTERNAL MEDICINE | Facility: CLINIC | Age: 46
End: 2021-04-22
Payer: COMMERCIAL

## 2021-04-22 VITALS
SYSTOLIC BLOOD PRESSURE: 116 MMHG | BODY MASS INDEX: 26.7 KG/M2 | DIASTOLIC BLOOD PRESSURE: 70 MMHG | TEMPERATURE: 97.7 F | WEIGHT: 136 LBS | HEIGHT: 60 IN

## 2021-04-22 VITALS
WEIGHT: 136 LBS | BODY MASS INDEX: 26.7 KG/M2 | DIASTOLIC BLOOD PRESSURE: 70 MMHG | TEMPERATURE: 97.7 F | SYSTOLIC BLOOD PRESSURE: 116 MMHG | HEIGHT: 60 IN

## 2021-04-22 PROCEDURE — 99213 OFFICE O/P EST LOW 20 MIN: CPT

## 2021-04-22 PROCEDURE — 99072 ADDL SUPL MATRL&STAF TM PHE: CPT

## 2021-04-22 NOTE — HISTORY OF PRESENT ILLNESS
[FreeTextEntry8] : pt presents c/o tender left nipple for 3 days-getting worse.\par She has h/o left inverted nipple but over past few days, the nipple/aureola is very firm/hard and tender to light touch.  \par Last mammo was 7/2020-was wnl at [facility].\par \par She is experiencing severe vasomotor symptoms of menopause for past few months with hot flashes and diffuse sweats alternating with chills throughout the day.

## 2021-04-22 NOTE — REVIEW OF SYSTEMS
[Hot Flashes] : hot flashes [Night Sweats] : night sweats [Fever] : no fever [Chills] : no chills [FreeTextEntry2] : hot flashes and sweats due to menopause

## 2021-04-22 NOTE — PHYSICAL EXAM
[Normal] : no acute distress, well nourished, well developed and well-appearing [No Axillary Lymphadenopathy] : no axillary lymphadenopathy [de-identified] : right breast-wnl/  left breast-inverted left nipple (old) -tender, firm/warm, indurated aureola and nipple with no discrete nodule or palpable mass. Patient

## 2021-06-28 ENCOUNTER — APPOINTMENT (OUTPATIENT)
Dept: INTERNAL MEDICINE | Facility: CLINIC | Age: 46
End: 2021-06-28
Payer: COMMERCIAL

## 2021-06-28 VITALS
DIASTOLIC BLOOD PRESSURE: 90 MMHG | HEART RATE: 90 BPM | WEIGHT: 136 LBS | SYSTOLIC BLOOD PRESSURE: 118 MMHG | HEIGHT: 60 IN | OXYGEN SATURATION: 98 % | BODY MASS INDEX: 26.7 KG/M2

## 2021-06-28 PROCEDURE — 99214 OFFICE O/P EST MOD 30 MIN: CPT

## 2021-06-28 PROCEDURE — 99072 ADDL SUPL MATRL&STAF TM PHE: CPT

## 2021-06-28 NOTE — ASSESSMENT
[FreeTextEntry1] : Exam and presentation is concerning for ? papilloma.\par Insurance will NOT cover another diagnostic mammo or sono for 3 months post last study.\par they recommend doing MRI of breast if warranted.\par \par I will try to get PA for MRI of breasts--I also rec f/u with breast surgeon for f/u evaluation.

## 2021-06-28 NOTE — PHYSICAL EXAM
[Normal] : normal rate, regular rhythm, normal S1 and S2 and no murmur heard [de-identified] : mild erythema surrounding left nipple--left nipple inverted with tender palpable nodule under nipple region.

## 2021-06-28 NOTE — HISTORY OF PRESENT ILLNESS
[FreeTextEntry1] : Patient presents for follow up evaluation for recurrent tenderness/erythema and abnormal left breast exam. [de-identified] : 2 months ago she presented with erythema and induration o left aureola with tenderness of left nipple with new onset inversion of the left nipple.  She was treated with antibiotic for ? mastitis and mammo and left breast sono were wnl.\par \par Symptoms of erythema and induration improved with oral antibiotics but nipple remained inverted. Then last week-she noted recurrent tenderness and warmth and erythema of left nipple with palpable lump under left nipple with occasional clear secretion/lactation from the nipple.  No fevers or chills.She started another course of oral Keflex last week and erythema is improved but still has tenderness and induration of left nipple\par \par She is in new Rachael-menopause with severe vasomotor symptoms and difficulty sleeping as well.--she did NOT yet start trial of HRT given recent breast issues. 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

## 2021-07-24 ENCOUNTER — NON-APPOINTMENT (OUTPATIENT)
Age: 46
End: 2021-07-24

## 2021-08-23 DIAGNOSIS — R92.8 OTHER ABNORMAL AND INCONCLUSIVE FINDINGS ON DIAGNOSTIC IMAGING OF BREAST: ICD-10-CM

## 2021-08-26 ENCOUNTER — APPOINTMENT (OUTPATIENT)
Dept: SURGICAL ONCOLOGY | Facility: CLINIC | Age: 46
End: 2021-08-26
Payer: COMMERCIAL

## 2021-08-26 VITALS
HEIGHT: 60 IN | OXYGEN SATURATION: 100 % | SYSTOLIC BLOOD PRESSURE: 139 MMHG | BODY MASS INDEX: 28.47 KG/M2 | WEIGHT: 145 LBS | HEART RATE: 86 BPM | DIASTOLIC BLOOD PRESSURE: 88 MMHG | RESPIRATION RATE: 16 BRPM

## 2021-08-26 PROCEDURE — 99204 OFFICE O/P NEW MOD 45 MIN: CPT

## 2021-08-26 NOTE — REVIEW OF SYSTEMS
[Negative] : Heme/Lymph [FreeTextEntry5] : History of palpitations [FreeTextEntry6] : History of asthma [FreeTextEntry8] : History of reflux

## 2021-08-26 NOTE — HISTORY OF PRESENT ILLNESS
[de-identified] : 46-year-old lady referred by her internist (Dr. Jamee BRAN) with LEFT NIPPLE inversion.\par \par This was a new finding in mid April, 2021.\par \par + Associated erythema, induration, and tenderness.\par \par No precipitating cause that she is aware of.\par No other signs or symptoms related to either breast.\par \par 4/28/2021:\par Diagnostic left mammogram and sonogram at [facility]: BI-RADS 3.\par Bilateral mammogram and ultrasound recommended for July 2021.\par \par She was treated with a 10-day course of oral antibiotics with some improvement, but without resolution.\par \par The above infectious/inflammatory changes recurred at the end of May 2021\par \par \par 7/22/2021:\par Left breast (retroareolar) core biopsy at [facility]:\par Benign and concordant.\par Chronic and focally acute inflammation associated with macrophages and rare multinucleated giant cells.\par Differential diagnosis includes healing abscess, and ruptured inflamed cyst.\par \par \par 7-16-21:\par Bilateral breast MRI at [facility]: BI-RADS 4.\par 1.  2.3 x 2.5 x 2.5 cm area of diffuse edema and skin thickening of the left nipple/areola/adjacent skin, with irregular, peripherally enhancing mass with multiple internal septations.\par 2.  Left breast (3:00) 8 cm FN:\par 6 x 4 x 5 mm nodule: Targeted ultrasound recommended.\par \par 7-22-21:\par Diagnostic left breast ultrasound at [facility]:\par 1.  Above retroareolar mass confirmed.\par Core biopsy recommended.\par Benign results (above), concordant.\par 2.  NO discrete sonographic finding in the left breast (3:00).\par With the above benign pathology report, 6-month follow-up MRI has been recommended for January 2022........ \par \par \par No other procedures related to either breast.\par No other personal history of breast disease.\par \par No personal history of malignancy.\par \par Not Ashkenazi.\par \par Menarche at 9.\par Nulliparous.\par LMP: 2/3/2021.\par No exogenous hormones.\par \par \par Breast cancer risk score = 22.1.\par \par \par No relatives with breast cancer.\par No relatives with ovarian cancer.\par \par Not Ashkenazi.\par \par Family history of malignancy:\par A sister had Hodgkin's disease at 17.\par \par \par PMD: Dr. Jamee BRAN.\par \par April 2019 she had a cardiology evaluation (Dr. Jay LISKER).\par She was experiencing palpitations.\par No underlying cardiovascular disease identified..\par \par No pacemaker or defibrillator.\par No anticoagulants.\par \par + Asthma.\par No current medical therapy warranted.\par Pulmonary: Dr. Ricardo RANDALL.\par \par History of GERD, diagnosed by EGD (below).\par Currently not requiring medical therapy.\par \par \par Her only current medication is Effexor for perimenopausal symptoms.\par This was prescribed by her gynecologist.\par \par \par GYN: Dr. Liana ROY.\par September 2021 visit is scheduled.\par \par \par April 2019: EGD (Dr. Jett CAZARES).\par Reflux esophagitis.\par \par She has not yet had her baseline colonoscopy

## 2021-08-26 NOTE — REASON FOR VISIT
[Initial Consultation] : an initial consultation for [Other: _____] : [unfilled] [FreeTextEntry2] : (Second opinion) left nipple inversion since mid April 2021

## 2021-08-26 NOTE — PHYSICAL EXAM
[Normal] : supple, no neck mass and thyroid not enlarged [Normal Neck Lymph Nodes] : normal neck lymph nodes  [Normal Supraclavicular Lymph Nodes] : normal supraclavicular lymph nodes [Normal Axillary Lymph Nodes] : normal axillary lymph nodes [Normal] : normal appearance, no rash, nodules, vesicles, ulcers, erythema [de-identified] : Groins not examined [de-identified] : Below

## 2021-08-26 NOTE — ASSESSMENT
[FreeTextEntry1] : Based on her history, her exam appears improved today.\par The left nipple inversion persists, but the infectious and inflammatory changes have resolved.\par \par \par July 2021:\par Left breast core biopsy at [facility]: Benign and concordant.\par \par Follow-up breast MRI due in January 2022................... \par \par She had bilateral mammography and breast ultrasounds earlier today at [facility].\par Await results..................\par \par Discs from [facility] return to patient\par \par \par This is her baseline examination with me.\par \par If the changes in the nipple and areolar complex on the left persist, without any significant improvement, I explained that she would likely benefit from a surgical biopsy for histology and microbiology.\par Such a procedure would be an outpatient/ambulatory operation.\par To be coordinated with plastic surgery.\par \par I have asked to see her at a 3 to 4-week interval.\par \par Reviewed in detail, all questions answered.\par \par Referring physician contacted through secure email

## 2021-09-10 ENCOUNTER — APPOINTMENT (OUTPATIENT)
Dept: OBGYN | Facility: CLINIC | Age: 46
End: 2021-09-10
Payer: COMMERCIAL

## 2021-09-10 VITALS
HEIGHT: 60 IN | BODY MASS INDEX: 28.47 KG/M2 | SYSTOLIC BLOOD PRESSURE: 132 MMHG | WEIGHT: 145 LBS | DIASTOLIC BLOOD PRESSURE: 90 MMHG

## 2021-09-10 DIAGNOSIS — N95.1 MENOPAUSAL AND FEMALE CLIMACTERIC STATES: ICD-10-CM

## 2021-09-10 DIAGNOSIS — Z01.419 ENCOUNTER FOR GYNECOLOGICAL EXAMINATION (GENERAL) (ROUTINE) W/OUT ABNORMAL FINDINGS: ICD-10-CM

## 2021-09-10 DIAGNOSIS — N92.6 IRREGULAR MENSTRUATION, UNSPECIFIED: ICD-10-CM

## 2021-09-10 PROCEDURE — 99214 OFFICE O/P EST MOD 30 MIN: CPT | Mod: 25

## 2021-09-10 PROCEDURE — 99386 PREV VISIT NEW AGE 40-64: CPT

## 2021-09-10 RX ORDER — CEFUROXIME AXETIL 500 MG/1
500 TABLET ORAL
Qty: 20 | Refills: 0 | Status: DISCONTINUED | COMMUNITY
Start: 2021-04-22 | End: 2021-09-10

## 2021-09-13 ENCOUNTER — LABORATORY RESULT (OUTPATIENT)
Age: 46
End: 2021-09-13

## 2021-09-13 ENCOUNTER — APPOINTMENT (OUTPATIENT)
Dept: INTERNAL MEDICINE | Facility: CLINIC | Age: 46
End: 2021-09-13
Payer: COMMERCIAL

## 2021-09-13 VITALS
TEMPERATURE: 97.5 F | WEIGHT: 150 LBS | HEIGHT: 60 IN | SYSTOLIC BLOOD PRESSURE: 126 MMHG | DIASTOLIC BLOOD PRESSURE: 80 MMHG | BODY MASS INDEX: 29.45 KG/M2

## 2021-09-13 DIAGNOSIS — N61.0 MASTITIS WITHOUT ABSCESS: ICD-10-CM

## 2021-09-13 DIAGNOSIS — N64.4 MASTODYNIA: ICD-10-CM

## 2021-09-13 LAB — HPV HIGH+LOW RISK DNA PNL CVX: NOT DETECTED

## 2021-09-13 PROCEDURE — 99214 OFFICE O/P EST MOD 30 MIN: CPT | Mod: 25

## 2021-09-13 PROCEDURE — 36415 COLL VENOUS BLD VENIPUNCTURE: CPT

## 2021-09-13 RX ORDER — PAROXETINE 7.5 MG/1
7.5 CAPSULE ORAL
Qty: 30 | Refills: 3 | Status: COMPLETED | COMMUNITY
Start: 2021-09-10 | End: 2021-09-13

## 2021-09-13 RX ORDER — ZOLPIDEM TARTRATE 10 MG/1
10 TABLET ORAL
Qty: 30 | Refills: 5 | Status: COMPLETED | COMMUNITY
Start: 2020-07-16 | End: 2021-09-13

## 2021-09-15 LAB
BASOPHILS # BLD AUTO: 0.04 K/UL
BASOPHILS NFR BLD AUTO: 0.4 %
EOSINOPHIL # BLD AUTO: 0.18 K/UL
EOSINOPHIL NFR BLD AUTO: 1.9 %
ERYTHROCYTE [SEDIMENTATION RATE] IN BLOOD BY WESTERGREN METHOD: 13 MM/HR
ESTIMATED AVERAGE GLUCOSE: 108 MG/DL
ESTRADIOL SERPL-MCNC: 47 PG/ML
FSH SERPL-MCNC: 52.9 IU/L
HBA1C MFR BLD HPLC: 5.4 %
HCT VFR BLD CALC: 42.2 %
HGB BLD-MCNC: 13.7 G/DL
IMM GRANULOCYTES NFR BLD AUTO: 0.3 %
LH SERPL-ACNC: 44.5 IU/L
LYMPHOCYTES # BLD AUTO: 2.67 K/UL
LYMPHOCYTES NFR BLD AUTO: 27.7 %
MAN DIFF?: NORMAL
MCHC RBC-ENTMCNC: 31 PG
MCHC RBC-ENTMCNC: 32.5 GM/DL
MCV RBC AUTO: 95.5 FL
MONOCYTES # BLD AUTO: 0.71 K/UL
MONOCYTES NFR BLD AUTO: 7.4 %
NEUTROPHILS # BLD AUTO: 6.02 K/UL
NEUTROPHILS NFR BLD AUTO: 62.3 %
PLATELET # BLD AUTO: 322 K/UL
RBC # BLD: 4.42 M/UL
RBC # FLD: 11.9 %
T3 SERPL-MCNC: 85 NG/DL
T3FREE SERPL-MCNC: 2.38 PG/ML
T3RU NFR SERPL: 1.1 TBI
T4 FREE SERPL-MCNC: 1.2 NG/DL
T4 SERPL-MCNC: 5.8 UG/DL
TSH SERPL-ACNC: 0.42 UIU/ML
WBC # FLD AUTO: 9.65 K/UL

## 2021-09-16 LAB — CYTOLOGY CVX/VAG DOC THIN PREP: NORMAL

## 2021-09-20 ENCOUNTER — APPOINTMENT (OUTPATIENT)
Dept: SURGICAL ONCOLOGY | Facility: CLINIC | Age: 46
End: 2021-09-20
Payer: COMMERCIAL

## 2021-09-20 ENCOUNTER — APPOINTMENT (OUTPATIENT)
Dept: OBGYN | Facility: CLINIC | Age: 46
End: 2021-09-20

## 2021-09-20 VITALS
HEIGHT: 60 IN | BODY MASS INDEX: 28.47 KG/M2 | DIASTOLIC BLOOD PRESSURE: 92 MMHG | HEART RATE: 83 BPM | WEIGHT: 145 LBS | OXYGEN SATURATION: 96 % | SYSTOLIC BLOOD PRESSURE: 152 MMHG

## 2021-09-20 PROCEDURE — 99215 OFFICE O/P EST HI 40 MIN: CPT

## 2021-09-20 NOTE — HISTORY OF PRESENT ILLNESS
[de-identified] : Scheduled follow-up visit.\par \par 46-year-old lady referred August 2021 with left nipple inversion since April 2021 (below).\par \par She had a diagnostic left mammogram in April 2021 at [facility]: BI-RADS 3.\par \par Breast MRI was ordered by her internist\par July 2021 breast MRI at [facility]: BI-RADS 4.\par A peripheral left breast nodule was noted for which a targeted ultrasound was recommended.\par She initially saw Dr. Susan PALLESCHI.\par Left breast sonogram guided core biopsy at [facility]: benign and concordant: \par Chronic and focally acute inflammation associated with macrophages and rare multinucleated giant cells.\par \par \par My PE confirmed the left nipple inversion.\par No other visible or palpable abnormalities noted.\par \par \par August 2021:\par Bilateral mammogram and sonogram at [facility]: BI-RADS 3.\par 1.  January 2022 breast MRI recommended, 6-month follow-up to benign core biopsy.\par 2.  February 2022 left breast sonogram recommended for short-term reevaluation of previously noted finding at 3:00..\par \par \par CC:\par September 9, 2021, the left nipple and areola became thickened.\par + Associated redness and tenderness.\par Painful and tender.\par No fever.\par Had some sweats, but not sure if related to possible infection, or due to perimenopause.\par \par September 13, 2021, she saw her internist.\par She was started on cefadroxil with improvement in her signs and symptoms, but not resolution.\par \par In retrospect, even prior to the above events (e.g. Labor Day 2021), the left nipple and areola although improved, and had never returned to normal\par \par \par August 2021: Referred by her internist (Dr. Jamee BRAN) with LEFT NIPPLE inversion.\par \par This was a new finding in mid April, 2021.\par \par + Associated erythema, induration, and tenderness.\par \par No precipitating cause that she is aware of.\par No other signs or symptoms related to either breast.\par \par 4/28/2021:\par Diagnostic left mammogram and sonogram at [facility]: BI-RADS 3.\par Bilateral mammogram and ultrasound recommended for July 2021.\par \par She was treated with a 10-day course of oral antibiotics with some improvement, but without resolution.\par \par The above infectious/inflammatory changes recurred at the end of May 2021\par \par \par 7/22/2021:\par Left breast (retroareolar) core biopsy at [facility]:\par Benign and concordant.\par Chronic and focally acute inflammation associated with macrophages and rare multinucleated giant cells.\par Differential diagnosis includes healing abscess, and ruptured inflamed cyst.\par \par \par 7-16-21:\par Bilateral breast MRI at [facility]: BI-RADS 4.\par 1.  2.3 x 2.5 x 2.5 cm area of diffuse edema and skin thickening of the left nipple/areola/adjacent skin, with irregular, peripherally enhancing mass with multiple internal septations.\par 2.  Left breast (3:00) 8 cm FN:\par 6 x 4 x 5 mm nodule: Targeted ultrasound recommended.\par \par 7-22-21:\par Diagnostic left breast ultrasound at [facility]:\par 1.  Above retroareolar mass confirmed.\par Core biopsy recommended.\par Benign results (above), concordant.\par 2.  NO discrete sonographic finding in the left breast (3:00).\par With the above benign pathology report, 6-month follow-up MRI has been recommended for January 2022........ \par \par \par No other procedures related to either breast.\par No other personal history of breast disease.\par \par No personal history of malignancy.\par \par Not Ashkenazi.\par \par Menarche at 9.\par Nulliparous.\par LMP: 2/3/2021.\par No exogenous hormones.\par \par \par Breast cancer risk score = 22.1.\par \par \par No relatives with breast cancer.\par No relatives with ovarian cancer.\par \par Not Ashkenazi.\par \par Family history of malignancy:\par A sister had Hodgkin's disease at 17.\par \par \par PMD: Dr. Jamee BRAN.\par \par April 2019 she had a cardiology evaluation (Dr. Jay LISKER).\par She was experiencing palpitations.\par No underlying cardiovascular disease identified..\par \par No pacemaker or defibrillator.\par No anticoagulants.\par \par + Asthma.\par No current medical therapy warranted.\par Pulmonary: Dr. Ricardo RANDALL.\par \par History of GERD, diagnosed by EGD (below).\par Currently not requiring medical therapy.\par \par \par Her only current medication is Effexor for perimenopausal symptoms.\par This was prescribed by her gynecologist.\par \par \par GYN: Dr. Roseanne STALLINGS.\par Seen in September 2021.\par She used to see Dr. Liana Danielle.\par \par \par April 2019: EGD (Dr. Jett CAZARES).\par Reflux esophagitis.\par \par She has not yet had her baseline colonoscopy

## 2021-09-20 NOTE — PHYSICAL EXAM
[Normal] : supple, no neck mass and thyroid not enlarged [Normal Neck Lymph Nodes] : normal neck lymph nodes  [Normal Supraclavicular Lymph Nodes] : normal supraclavicular lymph nodes [Normal Axillary Lymph Nodes] : normal axillary lymph nodes [Normal] : normal appearance, no rash, nodules, vesicles, ulcers, erythema [de-identified] : Groins not examined [de-identified] : Below

## 2021-09-20 NOTE — ASSESSMENT
[FreeTextEntry1] : Today, she appears to have a resolving nonsuppurative infection of the left nipple/areola/breast.\par The underlying etiology is not apparent from her history, physical, or imaging.\par \par I have asked her to return for reexamination in 2 or 3 weeks.\par We have the shared understanding that if the infectious changes resolve, but the skin thickening persists in the left nipple and areolar region, that surgical tissue sampling of this region would be appropriate.\par \par \par July 2021:\par Left breast core biopsy at [facility]: Benign and concordant.\par \par Follow-up breast MRI due in January 2022................... \par \par \par August 2021:\par Bilateral mammogram and sonogram at [facility]: BI-RADS 3.\par Left breast ultrasound recommended for February 2022................\par \par \par Reviewed in detail, all questions answered.

## 2021-09-20 NOTE — REASON FOR VISIT
[Follow-Up Visit] : a follow-up visit for [Other: _____] : [unfilled] [FreeTextEntry2] : Skin changes in the left nipple and areola

## 2021-09-20 NOTE — REVIEW OF SYSTEMS
[Negative] : Integumentary [FreeTextEntry8] : Reflux [de-identified] : Perimenopausal symptoms [FreeTextEntry1] : Increased risk of breast cancer

## 2021-09-24 NOTE — PHYSICAL EXAM
[Normal] : affect was normal and insight and judgment were intact [de-identified] : hot red induration noted along left aureola and nipple--no palpable mass

## 2021-09-24 NOTE — HISTORY OF PRESENT ILLNESS
[FreeTextEntry1] : Patient presents for follow up evaluation for multiple medical concerns including: for left nipple/aureola inflammation /pain and f/u for recent prermenopausal symptoms [de-identified] : 3 months ago she presented with erythema and induration o left aureola with tenderness of left nipple with new onset inversion of the left nipple.  She was treated with antibiotic for ? mastitis and mammo and left breast sono were wnl.\par \par Symptoms of erythema and induration improved with oral antibiotics but nipple remained inverted. Then last week-she noted recurrent tenderness and warmth and erythema of left nipple with palpable lump under left nipple with occasional clear secretion/lactation from the nipple.  No fevers or chills.She started another course of oral Keflex and erythema is improved but still has tenderness and induration of left nipple\par \par She is in new Rachael-menopause with severe vasomotor symptoms and difficulty sleeping as well.--she saw Dr. 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

## 2021-10-04 ENCOUNTER — TRANSCRIPTION ENCOUNTER (OUTPATIENT)
Age: 46
End: 2021-10-04

## 2021-10-07 ENCOUNTER — APPOINTMENT (OUTPATIENT)
Dept: SURGICAL ONCOLOGY | Facility: CLINIC | Age: 46
End: 2021-10-07
Payer: COMMERCIAL

## 2021-10-07 VITALS
HEART RATE: 80 BPM | OXYGEN SATURATION: 98 % | RESPIRATION RATE: 17 BRPM | SYSTOLIC BLOOD PRESSURE: 126 MMHG | HEIGHT: 60 IN | BODY MASS INDEX: 28.47 KG/M2 | WEIGHT: 145 LBS | DIASTOLIC BLOOD PRESSURE: 86 MMHG

## 2021-10-07 PROCEDURE — 99214 OFFICE O/P EST MOD 30 MIN: CPT

## 2021-10-13 NOTE — REASON FOR VISIT
[Follow-Up Visit] : a follow-up visit for [Other: _____] : [unfilled] [FreeTextEntry2] : Short-term reevaluation of a nonsuppurative left mastitis, history of increased risk of breast cancer

## 2021-10-13 NOTE — ASSESSMENT
[FreeTextEntry1] : Her symptoms have resolved, and her physical examination has improved.\par \par \par July 2021:\par Left breast core biopsy at [facility]: Benign and concordant.\par \par Follow-up breast MRI due in January 2022\par Prescription provided\par \par \par August 2021:\par Bilateral mammogram and sonogram at [facility]: BI-RADS 3.\par Left breast ultrasound recommended for February 2022\par Prescription provided\par \par \par I have asked to see her after the above imaging, sooner if her problems recur or new ones arise.\par \par Reviewed in detail, all questions answered.\par \par \par \par 10/13/2021:\par I returned her call.\par Her left mastitis has recurred.\par I sent a prescription to her pharmacy for a 10-day course of cefadroxil.\par I recommended warm compresses and wearing a bra, even at night.\par I have asked to see her next week, with the shared understanding that if things deteriorate she will call me and we will see her sooner.\par \par

## 2021-10-13 NOTE — HISTORY OF PRESENT ILLNESS
[de-identified] : Scheduled follow-up visit.\par \par 46-year-old lady\par \par She is feeling much better today with decreased pain, tenderness, and swelling in the left nipple and areolar complex region.\par No new specific or constitutional signs or symptoms.\par \par Seen 9/20/2021: With a nonsuppurative central left breast infection, with skin thickening of the nipple and areolar complex.\par \par \par Initially referred August 2021 with left nipple inversion since April 2021 (below).\par \par April 2021: Diagnostic left mammogram in  at [facility]: BI-RADS 3.\par \par Breast MRI was ordered by her internist\par July 2021 breast MRI at [facility]: BI-RADS 4.\par A peripheral left breast nodule was noted for which a targeted ultrasound was recommended.\par She initially saw Dr. Susan PALLESCHI.\par Left breast sonogram guided core biopsy at [facility]: benign and concordant: \par Chronic and focally acute inflammation associated with macrophages and rare multinucleated giant cells.\par \par \par August 2021:\par My PE confirmed the left nipple inversion.\par No other visible or palpable abnormalities noted.\par \par \par August 2021:\par Bilateral mammogram and sonogram at [facility]: BI-RADS 3.\par 1.  January 2022 breast MRI recommended, 6-month follow-up to benign core biopsy.\par 2.  February 2022 left breast sonogram recommended for short-term reevaluation of previously noted finding at 3:00..\par \par \par CC:\par September 9, 2021, the left nipple and areola became thickened.\par + Associated redness and tenderness.\par Painful and tender.\par No fever.\par Had some sweats, but not sure if related to possible infection, or due to perimenopause.\par \par September 13, 2021, she saw her internist.\par She was started on cefadroxil with improvement in her signs and symptoms, but not resolution.\par \par In retrospect, even prior to the above events (e.g. Labor Day 2021), the left nipple and areola although improved, and had never returned to normal.\par \par September 2021:\par My physical examination:\par Induration of the outer left nipple and areolar complex with associated erythema.\par Based on pictures she had from 2 weeks prior, her exam was significantly improved.\par \par \par August 2021: Referred by her internist (Dr. Jamee BRAN) with LEFT NIPPLE inversion.\par \par This was a new finding in mid April, 2021.\par \par + Associated erythema, induration, and tenderness.\par \par No precipitating cause that she is aware of.\par No other signs or symptoms related to either breast.\par \par 4/28/2021:\par Diagnostic left mammogram and sonogram at [facility]: BI-RADS 3.\par Bilateral mammogram and ultrasound recommended for July 2021.\par \par She was treated with a 10-day course of oral antibiotics with some improvement, but without resolution.\par \par The above infectious/inflammatory changes recurred at the end of May 2021\par \par \par 7/22/2021:\par Left breast (retroareolar) core biopsy at [facility]:\par Benign and concordant.\par Chronic and focally acute inflammation associated with macrophages and rare multinucleated giant cells.\par Differential diagnosis includes healing abscess, and ruptured inflamed cyst.\par \par \par 7-16-21:\par Bilateral breast MRI at [facility]: BI-RADS 4.\par 1.  2.3 x 2.5 x 2.5 cm area of diffuse edema and skin thickening of the left nipple/areola/adjacent skin, with irregular, peripherally enhancing mass with multiple internal septations.\par 2.  Left breast (3:00) 8 cm FN:\par 6 x 4 x 5 mm nodule: Targeted ultrasound recommended.\par \par 7-22-21:\par Diagnostic left breast ultrasound at [facility]:\par 1.  Above retroareolar mass confirmed.\par Core biopsy recommended.\par Benign results (above), concordant.\par 2.  NO discrete sonographic finding in the left breast (3:00).\par With the above benign pathology report, 6-month follow-up MRI has been recommended for January 2022........ \par \par \par No other procedures related to either breast.\par No other personal history of breast disease.\par \par No personal history of malignancy.\par \par Not Ashkenazi.\par \par Menarche at 9.\par Nulliparous.\par LMP: 2/3/2021.\par No exogenous hormones.\par \par \par Breast cancer risk score = 22.1.\par \par \par No relatives with breast cancer.\par No relatives with ovarian cancer.\par \par Not Ashkenazi.\par \par Family history of malignancy:\par A sister had Hodgkin's disease at 17.\par \par \par PMD: Dr. Jamee BRAN.\par \par April 2019 she had a cardiology evaluation (Dr. Jay LISKER).\par She was experiencing palpitations.\par No underlying cardiovascular disease identified..\par \par No pacemaker or defibrillator.\par No anticoagulants.\par \par + Asthma.\par No current medical therapy warranted.\par Pulmonary: Dr. Ricardo RANDALL.\par \par History of GERD, diagnosed by EGD (below).\par Currently not requiring medical therapy.\par \par \par Her only current medication is Effexor for perimenopausal symptoms.\par This was prescribed by her gynecologist.\par \par \par GYN: Dr. Roseanne STALLINGS.\par Seen in September 2021.\par She used to see Dr. Liana Danielle.\par \par \par April 2019: EGD (Dr. Jett CAZARES).\par Reflux esophagitis.\par \par She has not yet had her baseline colonoscopy

## 2021-10-13 NOTE — REVIEW OF SYSTEMS
[Negative] : Integumentary [FreeTextEntry6] : Asthma [FreeTextEntry7] : Left mastitis [FreeTextEntry8] : Reflux [de-identified] : Perimenopausal symptoms [FreeTextEntry1] : Increased risk of breast cancer

## 2021-10-13 NOTE — PHYSICAL EXAM
[Normal] : supple, no neck mass and thyroid not enlarged [Normal Neck Lymph Nodes] : normal neck lymph nodes  [Normal Supraclavicular Lymph Nodes] : normal supraclavicular lymph nodes [Normal Axillary Lymph Nodes] : normal axillary lymph nodes [Normal] : normal appearance, no rash, nodules, vesicles, ulcers, erythema [de-identified] : Groins not examined [de-identified] : Below

## 2021-10-18 ENCOUNTER — APPOINTMENT (OUTPATIENT)
Dept: SURGICAL ONCOLOGY | Facility: CLINIC | Age: 46
End: 2021-10-18
Payer: COMMERCIAL

## 2021-10-18 VITALS
SYSTOLIC BLOOD PRESSURE: 143 MMHG | RESPIRATION RATE: 16 BRPM | HEIGHT: 60 IN | WEIGHT: 152 LBS | HEART RATE: 81 BPM | OXYGEN SATURATION: 99 % | BODY MASS INDEX: 29.84 KG/M2 | DIASTOLIC BLOOD PRESSURE: 83 MMHG

## 2021-10-18 PROCEDURE — 99214 OFFICE O/P EST MOD 30 MIN: CPT

## 2021-10-20 ENCOUNTER — APPOINTMENT (OUTPATIENT)
Dept: PLASTIC SURGERY | Facility: CLINIC | Age: 46
End: 2021-10-20

## 2021-10-20 ENCOUNTER — APPOINTMENT (OUTPATIENT)
Dept: INTERNAL MEDICINE | Facility: CLINIC | Age: 46
End: 2021-10-20
Payer: COMMERCIAL

## 2021-10-20 VITALS
DIASTOLIC BLOOD PRESSURE: 80 MMHG | HEART RATE: 88 BPM | WEIGHT: 150 LBS | SYSTOLIC BLOOD PRESSURE: 120 MMHG | OXYGEN SATURATION: 98 % | BODY MASS INDEX: 29.45 KG/M2 | HEIGHT: 60 IN | TEMPERATURE: 97.2 F

## 2021-10-20 DIAGNOSIS — N60.12 DIFFUSE CYSTIC MASTOPATHY OF LEFT BREAST: ICD-10-CM

## 2021-10-20 DIAGNOSIS — Z01.818 ENCOUNTER FOR OTHER PREPROCEDURAL EXAMINATION: ICD-10-CM

## 2021-10-20 PROCEDURE — 99214 OFFICE O/P EST MOD 30 MIN: CPT

## 2021-10-21 ENCOUNTER — OUTPATIENT (OUTPATIENT)
Dept: OUTPATIENT SERVICES | Facility: HOSPITAL | Age: 46
LOS: 1 days | End: 2021-10-21

## 2021-10-21 VITALS
OXYGEN SATURATION: 98 % | HEIGHT: 59.5 IN | WEIGHT: 151.9 LBS | HEART RATE: 87 BPM | TEMPERATURE: 98 F | DIASTOLIC BLOOD PRESSURE: 88 MMHG | SYSTOLIC BLOOD PRESSURE: 138 MMHG | RESPIRATION RATE: 16 BRPM

## 2021-10-21 DIAGNOSIS — N64.59 OTHER SIGNS AND SYMPTOMS IN BREAST: ICD-10-CM

## 2021-10-21 DIAGNOSIS — Z98.890 OTHER SPECIFIED POSTPROCEDURAL STATES: Chronic | ICD-10-CM

## 2021-10-21 DIAGNOSIS — F41.9 ANXIETY DISORDER, UNSPECIFIED: ICD-10-CM

## 2021-10-21 DIAGNOSIS — T78.40XA ALLERGY, UNSPECIFIED, INITIAL ENCOUNTER: ICD-10-CM

## 2021-10-21 PROBLEM — N60.12 CHRONIC MASTITIS OF LEFT BREAST: Status: ACTIVE | Noted: 2021-10-21

## 2021-10-21 PROBLEM — Z01.818 PREOPERATIVE CLEARANCE: Status: ACTIVE | Noted: 2021-10-21

## 2021-10-21 LAB
ANION GAP SERPL CALC-SCNC: 14 MMOL/L — SIGNIFICANT CHANGE UP (ref 7–14)
BUN SERPL-MCNC: 10 MG/DL — SIGNIFICANT CHANGE UP (ref 7–23)
CALCIUM SERPL-MCNC: 9.3 MG/DL — SIGNIFICANT CHANGE UP (ref 8.4–10.5)
CHLORIDE SERPL-SCNC: 104 MMOL/L — SIGNIFICANT CHANGE UP (ref 98–107)
CO2 SERPL-SCNC: 24 MMOL/L — SIGNIFICANT CHANGE UP (ref 22–31)
CREAT SERPL-MCNC: 0.58 MG/DL — SIGNIFICANT CHANGE UP (ref 0.5–1.3)
GLUCOSE SERPL-MCNC: 68 MG/DL — LOW (ref 70–99)
HCG UR QL: NEGATIVE — SIGNIFICANT CHANGE UP
HCT VFR BLD CALC: 41.5 % — SIGNIFICANT CHANGE UP (ref 34.5–45)
HGB BLD-MCNC: 14 G/DL — SIGNIFICANT CHANGE UP (ref 11.5–15.5)
MCHC RBC-ENTMCNC: 31.2 PG — SIGNIFICANT CHANGE UP (ref 27–34)
MCHC RBC-ENTMCNC: 33.7 GM/DL — SIGNIFICANT CHANGE UP (ref 32–36)
MCV RBC AUTO: 92.4 FL — SIGNIFICANT CHANGE UP (ref 80–100)
NRBC # BLD: 0 /100 WBCS — SIGNIFICANT CHANGE UP
NRBC # FLD: 0 K/UL — SIGNIFICANT CHANGE UP
PLATELET # BLD AUTO: 341 K/UL — SIGNIFICANT CHANGE UP (ref 150–400)
POTASSIUM SERPL-MCNC: 3.9 MMOL/L — SIGNIFICANT CHANGE UP (ref 3.5–5.3)
POTASSIUM SERPL-SCNC: 3.9 MMOL/L — SIGNIFICANT CHANGE UP (ref 3.5–5.3)
RBC # BLD: 4.49 M/UL — SIGNIFICANT CHANGE UP (ref 3.8–5.2)
RBC # FLD: 11.8 % — SIGNIFICANT CHANGE UP (ref 10.3–14.5)
SODIUM SERPL-SCNC: 142 MMOL/L — SIGNIFICANT CHANGE UP (ref 135–145)
WBC # BLD: 7.9 K/UL — SIGNIFICANT CHANGE UP (ref 3.8–10.5)
WBC # FLD AUTO: 7.9 K/UL — SIGNIFICANT CHANGE UP (ref 3.8–10.5)

## 2021-10-21 RX ORDER — MELOXICAM 15 MG/1
15 TABLET ORAL
Qty: 30 | Refills: 2 | Status: COMPLETED | COMMUNITY
Start: 2021-09-29 | End: 2021-10-21

## 2021-10-21 RX ORDER — VENLAFAXINE HYDROCHLORIDE 37.5 MG/1
37.5 CAPSULE, EXTENDED RELEASE ORAL
Qty: 30 | Refills: 0 | Status: COMPLETED | COMMUNITY
Start: 2021-05-20

## 2021-10-21 RX ORDER — CEFADROXIL 500 MG/1
500 CAPSULE ORAL TWICE DAILY
Qty: 28 | Refills: 0 | Status: COMPLETED | COMMUNITY
Start: 2020-12-02 | End: 2021-10-21

## 2021-10-21 RX ORDER — VENLAFAXINE HYDROCHLORIDE 37.5 MG/1
37.5 CAPSULE, EXTENDED RELEASE ORAL
Qty: 30 | Refills: 0 | Status: COMPLETED | COMMUNITY
Start: 2021-05-20 | End: 1900-01-01

## 2021-10-21 RX ORDER — SODIUM CHLORIDE 9 MG/ML
1000 INJECTION, SOLUTION INTRAVENOUS
Refills: 0 | Status: DISCONTINUED | OUTPATIENT
Start: 2021-10-26 | End: 2021-11-09

## 2021-10-21 NOTE — H&P PST ADULT - PROBLEM SELECTOR PLAN 1
scheduled for resection portion of left breast nipple and areola with Dr. Maldonado on 10/26/2021.  Verbal and written pre-op instructions provided to patient. Patient verbalized understanding and will call surgeons office for revised instructions if surgery is rescheduled.   Pepcid for GI prophylaxis provided.   patient given verbal and written instruction with teach back on chlorhexidine shampoo, and the patient verbalized understanding with return demonstration.   Patient aware of need for COVID testing prior to  procedure at a [facility]  and advised to coordinate with surgeon to get tested within 72 hours of procedure.   Urine pregnancy test DOS-Urine cup provided.  Patient will obtain medical clearance as per surgeons request.

## 2021-10-21 NOTE — H&P PST ADULT - NSANTHOSAYNRD_GEN_A_CORE
No. TRI screening performed.  STOP BANG Legend: 0-2 = LOW Risk; 3-4 = INTERMEDIATE Risk; 5-8 = HIGH Risk

## 2021-10-21 NOTE — H&P PST ADULT - HISTORY OF PRESENT ILLNESS
45 yo female presents to PST unit with pre-op diagnosis of other signs and symptoms in breast scheduled for resection portion of left breast nipple and areola with Dr. Maldonado. She reports sudden redness an inflammation of left breast that began in April s/p biopsy with benign findings.

## 2021-10-21 NOTE — H&P PST ADULT - ATTENDING COMMENTS
46-year-old lady with a chronic infectious and inflammatory process involving the right nipple and areolar complex, scheduled for incisional biopsy of plastics closure (Dr. Erwin Pena).    Discussed with her prior to surgery, and again a day of operation.    All questions answered, consent on chart

## 2021-10-22 ENCOUNTER — APPOINTMENT (OUTPATIENT)
Dept: PLASTIC SURGERY | Facility: CLINIC | Age: 46
End: 2021-10-22
Payer: COMMERCIAL

## 2021-10-22 VITALS
HEIGHT: 60 IN | OXYGEN SATURATION: 98 % | BODY MASS INDEX: 29.84 KG/M2 | DIASTOLIC BLOOD PRESSURE: 91 MMHG | SYSTOLIC BLOOD PRESSURE: 139 MMHG | HEART RATE: 88 BPM | TEMPERATURE: 98.3 F | WEIGHT: 152 LBS

## 2021-10-22 DIAGNOSIS — Z01.818 ENCOUNTER FOR OTHER PREPROCEDURAL EXAMINATION: ICD-10-CM

## 2021-10-22 PROCEDURE — 99203 OFFICE O/P NEW LOW 30 MIN: CPT

## 2021-10-23 ENCOUNTER — APPOINTMENT (OUTPATIENT)
Dept: DISASTER EMERGENCY | Facility: CLINIC | Age: 46
End: 2021-10-23

## 2021-10-24 LAB — SARS-COV-2 N GENE NPH QL NAA+PROBE: NOT DETECTED

## 2021-10-25 ENCOUNTER — TRANSCRIPTION ENCOUNTER (OUTPATIENT)
Age: 46
End: 2021-10-25

## 2021-10-25 NOTE — HISTORY OF PRESENT ILLNESS
[FreeTextEntry1] : Right Breast Biopsy [FreeTextEntry2] : 10/26/2021 at [facility] [FreeTextEntry3] : Dr. Dominic Maldonado and Dr. Erwin Pena

## 2021-10-25 NOTE — PHYSICAL EXAM
[Normal TMs] : both tympanic membranes were normal [Normal] : affect was normal and insight and judgment were intact [de-identified] : hot red induration noted along left aureola and nipple--no palpable mass

## 2021-10-25 NOTE — ASU DISCHARGE PLAN (ADULT/PEDIATRIC) - NURSING INSTRUCTIONS
You received IV Tylenol for pain management at 08:45AM. Please DO NOT take any Tylenol (Acetaminophen) containing products, such as Vicodin, Percocet, Excedrin, and cold medications for the next 6 hours (until 02:45 PM). DO NOT TAKE MORE THAN 3000 MG OF TYLENOL in a 24 hour period.     You received IV Toradol for pain management at 09:15AM. Please DO NOT take Motrin/Ibuprofen/Advil/Aleve/NSAIDs (Non-Steroidal Anti-Inflammatory Drugs) for the next 6 hours (until 03:15 PM).

## 2021-10-25 NOTE — ASU DISCHARGE PLAN (ADULT/PEDIATRIC) - FOLLOW UP APPOINTMENTS
[facility] may also call Recovery Room (PACU) 24/7 @ [phone]

## 2021-10-25 NOTE — CONSULT LETTER
[Dear  ___] : Dear  [unfilled], [FreeTextEntry1] : I had the pleasure of seeing Ms.DOMINIQUE GARLAND  today for a preop medical clearance evaluation.\par \par Patient is medically stable and cleared for the proposed surgery.\par Please see my note below for details.\par If you should have any questions, please do not hesitate to contact me.\par \par Sincerely,\par \par Jamee Dunn M.D.\par

## 2021-10-25 NOTE — ASU DISCHARGE PLAN (ADULT/PEDIATRIC) - ASU DC SPECIAL INSTRUCTIONSFT
Initial followup with plastic surgery in the next 5-15 days, regarding matters of bandages, activities, and showering.    Dr. Maldonado should call with pathology report in approximately 2 weeks.  The conversation will determine further management. Initial followup with plastic surgery in the next 5-15 days, regarding matters of bandages, activities, and showering. Wait 48 hours to shower. Leave dressing in place, pat dry after shower. Please follow up with Dr. Pena within x1 week after discharge from the hospital. You may call [phone] to schedule an appointment.     Dr. Maldonado should call with pathology report in approximately 2 weeks.  The conversation will determine further management.

## 2021-10-26 ENCOUNTER — APPOINTMENT (OUTPATIENT)
Dept: PLASTIC SURGERY | Facility: HOSPITAL | Age: 46
End: 2021-10-26
Payer: COMMERCIAL

## 2021-10-26 ENCOUNTER — APPOINTMENT (OUTPATIENT)
Dept: SURGICAL ONCOLOGY | Facility: HOSPITAL | Age: 46
End: 2021-10-26

## 2021-10-26 ENCOUNTER — OUTPATIENT (OUTPATIENT)
Dept: OUTPATIENT SERVICES | Facility: HOSPITAL | Age: 46
LOS: 1 days | Discharge: HOME | End: 2021-10-26
Payer: COMMERCIAL

## 2021-10-26 ENCOUNTER — RESULT REVIEW (OUTPATIENT)
Age: 46
End: 2021-10-26

## 2021-10-26 VITALS
SYSTOLIC BLOOD PRESSURE: 139 MMHG | WEIGHT: 151.9 LBS | HEIGHT: 59.5 IN | DIASTOLIC BLOOD PRESSURE: 99 MMHG | RESPIRATION RATE: 16 BRPM | TEMPERATURE: 98 F | OXYGEN SATURATION: 100 % | HEART RATE: 88 BPM

## 2021-10-26 VITALS
RESPIRATION RATE: 15 BRPM | OXYGEN SATURATION: 99 % | SYSTOLIC BLOOD PRESSURE: 116 MMHG | HEART RATE: 77 BPM | DIASTOLIC BLOOD PRESSURE: 76 MMHG

## 2021-10-26 DIAGNOSIS — N64.59 OTHER SIGNS AND SYMPTOMS IN BREAST: ICD-10-CM

## 2021-10-26 DIAGNOSIS — Z98.890 OTHER SPECIFIED POSTPROCEDURAL STATES: Chronic | ICD-10-CM

## 2021-10-26 PROBLEM — F41.9 ANXIETY DISORDER, UNSPECIFIED: Chronic | Status: ACTIVE | Noted: 2021-10-21

## 2021-10-26 LAB
GRAM STN FLD: SIGNIFICANT CHANGE UP
HCG UR QL: NEGATIVE — SIGNIFICANT CHANGE UP
NIGHT BLUE STAIN TISS: SIGNIFICANT CHANGE UP
SPECIMEN SOURCE: SIGNIFICANT CHANGE UP
SPECIMEN SOURCE: SIGNIFICANT CHANGE UP

## 2021-10-26 PROCEDURE — 88307 TISSUE EXAM BY PATHOLOGIST: CPT | Mod: 26

## 2021-10-26 PROCEDURE — 19120 REMOVAL OF BREAST LESION: CPT | Mod: LT

## 2021-10-26 PROCEDURE — 19499 UNLISTED PROCEDURE BREAST: CPT | Mod: LT

## 2021-10-26 RX ORDER — ONDANSETRON 8 MG/1
4 TABLET, FILM COATED ORAL ONCE
Refills: 0 | Status: COMPLETED | OUTPATIENT
Start: 2021-10-26 | End: 2021-10-26

## 2021-10-26 RX ORDER — OXYCODONE HYDROCHLORIDE 5 MG/1
1 TABLET ORAL
Qty: 10 | Refills: 0
Start: 2021-10-26 | End: 2021-10-27

## 2021-10-26 RX ORDER — VENLAFAXINE HCL 75 MG
1 CAPSULE, EXT RELEASE 24 HR ORAL
Qty: 0 | Refills: 0 | DISCHARGE

## 2021-10-26 RX ORDER — OXYCODONE HYDROCHLORIDE 5 MG/1
5 TABLET ORAL ONCE
Refills: 0 | Status: DISCONTINUED | OUTPATIENT
Start: 2021-10-26 | End: 2021-10-26

## 2021-10-26 RX ORDER — FENTANYL CITRATE 50 UG/ML
25 INJECTION INTRAVENOUS
Refills: 0 | Status: DISCONTINUED | OUTPATIENT
Start: 2021-10-26 | End: 2021-10-26

## 2021-10-26 RX ADMIN — ONDANSETRON 4 MILLIGRAM(S): 8 TABLET, FILM COATED ORAL at 10:00

## 2021-10-26 RX ADMIN — OXYCODONE HYDROCHLORIDE 5 MILLIGRAM(S): 5 TABLET ORAL at 10:45

## 2021-10-26 NOTE — BRIEF OPERATIVE NOTE - OPERATION/FINDINGS
Wedge excisional biopsy of partial aerola and nipple. Reconstruction with local tissue rearrangement

## 2021-10-28 ENCOUNTER — NON-APPOINTMENT (OUTPATIENT)
Age: 46
End: 2021-10-28

## 2021-10-28 ENCOUNTER — RESULT REVIEW (OUTPATIENT)
Age: 46
End: 2021-10-28

## 2021-10-28 ENCOUNTER — OUTPATIENT (OUTPATIENT)
Dept: OUTPATIENT SERVICES | Facility: HOSPITAL | Age: 46
LOS: 1 days | End: 2021-10-28
Payer: COMMERCIAL

## 2021-10-28 DIAGNOSIS — N64.59 OTHER SIGNS AND SYMPTOMS IN BREAST: ICD-10-CM

## 2021-10-28 DIAGNOSIS — Z98.890 OTHER SPECIFIED POSTPROCEDURAL STATES: Chronic | ICD-10-CM

## 2021-10-28 PROCEDURE — 88321 CONSLTJ&REPRT SLD PREP ELSWR: CPT

## 2021-10-31 LAB
CULTURE RESULTS: SIGNIFICANT CHANGE UP
SPECIMEN SOURCE: SIGNIFICANT CHANGE UP

## 2021-11-02 ENCOUNTER — APPOINTMENT (OUTPATIENT)
Dept: PLASTIC SURGERY | Facility: CLINIC | Age: 46
End: 2021-11-02
Payer: COMMERCIAL

## 2021-11-02 PROCEDURE — 99024 POSTOP FOLLOW-UP VISIT: CPT

## 2021-11-04 LAB — SURGICAL PATHOLOGY STUDY: SIGNIFICANT CHANGE UP

## 2021-11-08 LAB — SURGICAL PATHOLOGY STUDY: SIGNIFICANT CHANGE UP

## 2021-11-10 NOTE — ASSESSMENT
[FreeTextEntry1] : \par \par July 2021:\par Left breast core biopsy at [facility]: Benign and concordant.\par \par Follow-up breast MRI due in January 2022\par Prescription provided\par \par \par August 2021:\par Bilateral mammogram and sonogram at [facility]: BI-RADS 3.\par Left breast ultrasound recommended for February 2022\par Prescription provided\par \par \par I had asked to see her after the above imaging, sooner if her problems recur or new ones arise.\par \par Because of the recurrent infections, I have suggested an incisional biopsy when the acute changes have subsided.\par She understands, agrees, and would like to proceed with the surgical intervention.\par Paperwork submitted.\par We will coordinate with plastic surgery.\par \par Reviewed in detail, all questions answered.\par \par \par 11/10/2021.\par We spoke.\par October 26, 2021, she had an incisional biopsy of the left nipple and areolar complex for chronic, intermittent, infectious and inflammatory changes in this region.\par Plastics: Dr. Erwin Pena.\par Surgical pathology:\par Unremarkable skin and fatty breast tissue.\par \par There is also tissue submitted for microbiology is still pending.................... \par \par She is having some discomfort in the left breast, but no constitutional signs or symptoms.\par I offered to see her in the office tomorrow, she does not think that is necessary presently.\par \par Note dictated\par

## 2021-11-10 NOTE — REVIEW OF SYSTEMS
[Negative] : Heme/Lymph [FreeTextEntry6] : Asthma [FreeTextEntry8] : Reflux [de-identified] : Perimenopausal symptoms

## 2021-11-10 NOTE — HISTORY OF PRESENT ILLNESS
[de-identified] : Scheduled follow-up visit.\par \par 46-year-old lady.\par \par October 7, 2021 office visit:\par She was feeling much better today with decreased pain, tenderness, and swelling in the left nipple and areolar complex region.\par No new specific or constitutional signs or symptoms.\par Her examination is normal.\par \par October 13, 2021, she called me reporting a recurrence in her left mastitis signs and symptoms.\par I advised supportive garments and warm compresses.\par I prescribed cefadroxil.\par \par She presents for reexamination.\par \par Her constitutional and local signs and symptoms have subsided.\par Understandably, she is frustrated, since she has been having these infections intermittently since April 2021, and this is her fifth course of antibiotics, with improvement, near resolution, but then recurrence of the infection.\par \par \par Seen 9/20/2021: With a nonsuppurative central left breast infection, with skin thickening of the nipple and areolar complex.\par \par \par Initially referred August 2021 with left nipple inversion since April 2021 (below).\par \par April 2021: Diagnostic left mammogram in  at [facility]: BI-RADS 3.\par \par Breast MRI was ordered by her internist\par July 2021 breast MRI at [facility]: BI-RADS 4.\par A peripheral left breast nodule was noted for which a targeted ultrasound was recommended.\par She initially saw Dr. Susan PALLESCHI.\par Left breast sonogram guided core biopsy at [facility]: benign and concordant: \par Chronic and focally acute inflammation associated with macrophages and rare multinucleated giant cells.\par \par \par August 2021:\par My PE confirmed the left nipple inversion.\par No other visible or palpable abnormalities noted.\par \par \par August 2021:\par Bilateral mammogram and sonogram at [facility]: BI-RADS 3.\par 1.  January 2022 breast MRI recommended, 6-month follow-up to benign core biopsy.\par 2.  February 2022 left breast sonogram recommended for short-term reevaluation of previously noted finding at 3:00..\par \par \par CC:\par September 9, 2021, the left nipple and areola became thickened.\par + Associated redness and tenderness.\par Painful and tender.\par No fever.\par Had some sweats, but not sure if related to possible infection, or due to perimenopause.\par \par September 13, 2021, she saw her internist.\par She was started on cefadroxil with improvement in her signs and symptoms, but not resolution.\par \par In retrospect, even prior to the above events (e.g. Labor Day 2021), the left nipple and areola although improved, and had never returned to normal.\par \par September 2021:\par My physical examination:\par Induration of the outer left nipple and areolar complex with associated erythema.\par Based on pictures she had from 2 weeks prior, her exam was significantly improved.\par \par \par August 2021: Referred by her internist (Dr. Jamee BRAN) with LEFT NIPPLE inversion.\par \par This was a new finding in mid April, 2021.\par \par + Associated erythema, induration, and tenderness.\par \par No precipitating cause that she is aware of.\par No other signs or symptoms related to either breast.\par \par 4/28/2021:\par Diagnostic left mammogram and sonogram at [facility]: BI-RADS 3.\par Bilateral mammogram and ultrasound recommended for July 2021.\par \par She was treated with a 10-day course of oral antibiotics with some improvement, but without resolution.\par \par The above infectious/inflammatory changes recurred at the end of May 2021\par \par \par 7/22/2021:\par Left breast (retroareolar) core biopsy at [facility]:\par Benign and concordant.\par Chronic and focally acute inflammation associated with macrophages and rare multinucleated giant cells.\par Differential diagnosis includes healing abscess, and ruptured inflamed cyst.\par \par \par 7-16-21:\par Bilateral breast MRI at [facility]: BI-RADS 4.\par 1.  2.3 x 2.5 x 2.5 cm area of diffuse edema and skin thickening of the left nipple/areola/adjacent skin, with irregular, peripherally enhancing mass with multiple internal septations.\par 2.  Left breast (3:00) 8 cm FN:\par 6 x 4 x 5 mm nodule: Targeted ultrasound recommended.\par \par 7-22-21:\par Diagnostic left breast ultrasound at [facility]:\par 1.  Above retroareolar mass confirmed.\par Core biopsy recommended.\par Benign results (above), concordant.\par 2.  NO discrete sonographic finding in the left breast (3:00).\par With the above benign pathology report, 6-month follow-up MRI has been recommended for January 2022........ \par \par \par No other procedures related to either breast.\par No other personal history of breast disease.\par \par No personal history of malignancy.\par \par Not Ashkenazi.\par \par Menarche at 9.\par Nulliparous.\par LMP: 2/3/2021.\par No exogenous hormones.\par \par \par Breast cancer risk score = 22.1.\par \par \par No relatives with breast cancer.\par No relatives with ovarian cancer.\par \par Not Ashkenazi.\par \par Family history of malignancy:\par A sister had Hodgkin's disease at 17.\par \par \par PMD: Dr. Jamee BRAN.\par \par April 2019 she had a cardiology evaluation (Dr. Jay LISKER).\par She was experiencing palpitations.\par No underlying cardiovascular disease identified..\par \par No pacemaker or defibrillator.\par No anticoagulants.\par \par + Asthma.\par No current medical therapy warranted.\par Pulmonary: Dr. Ricardo RANDALL.\par \par History of GERD, diagnosed by EGD (below).\par Currently not requiring medical therapy.\par \par \par Her only current medication is Effexor for perimenopausal symptoms.\par This was prescribed by her gynecologist.\par \par \par GYN: Dr. Roseanne STALLINGS.\par Seen in September 2021.\par She used to see Dr. Liana Danielle.\par \par \par April 2019: EGD (Dr. Jett CAZARES).\par Reflux esophagitis.\par \par She has not yet had her baseline colonoscopy

## 2021-11-10 NOTE — REASON FOR VISIT
[Follow-Up Visit] : a follow-up visit for [Other: _____] : [unfilled] [FreeTextEntry2] : Recurrent left mastitis

## 2021-11-10 NOTE — PHYSICAL EXAM
[Normal] : supple, no neck mass and thyroid not enlarged [Normal Neck Lymph Nodes] : normal neck lymph nodes  [Normal Supraclavicular Lymph Nodes] : normal supraclavicular lymph nodes [Normal Axillary Lymph Nodes] : normal axillary lymph nodes [Normal] : normal appearance, no rash, nodules, vesicles, ulcers, erythema [de-identified] : Groins not examined [de-identified] : Below

## 2021-11-15 ENCOUNTER — APPOINTMENT (OUTPATIENT)
Dept: SURGICAL ONCOLOGY | Facility: CLINIC | Age: 46
End: 2021-11-15
Payer: COMMERCIAL

## 2021-11-15 PROCEDURE — 99024 POSTOP FOLLOW-UP VISIT: CPT

## 2021-11-15 NOTE — REVIEW OF SYSTEMS
[Negative] : Heme/Lymph [FreeTextEntry6] : Asthma [FreeTextEntry8] : Reflux [de-identified] : Perimenopausal symptoms

## 2021-11-15 NOTE — REASON FOR VISIT
[Post-Op] : a post-op for [Other: _____] : [unfilled] [FreeTextEntry2] : Incisional biopsy of chronically infected/inflamed area of the left areola

## 2021-11-15 NOTE — ASSESSMENT
[FreeTextEntry1] : Presently, recuperating well from the incisional biopsy of the left nipple.\par \par I have asked to see her in 2 or 3 weeks, sooner if needed.\par \par \par \par \par August 2021:\par Bilateral mammogram and sonogram at [facility]: BI-RADS 3.\par She has already been given a prescription for left breast ultrasound in February 2002.\par \par \par She is due for a follow-up breast MRI in January 2022.\par This is a 6-month follow-up to a benign and concordant left breast MRI core biopsy at [facility].\par She has prescription also.

## 2021-11-15 NOTE — PHYSICAL EXAM
[Normal] : supple, no neck mass and thyroid not enlarged [Normal Neck Lymph Nodes] : normal neck lymph nodes  [Normal Supraclavicular Lymph Nodes] : normal supraclavicular lymph nodes [Normal Axillary Lymph Nodes] : normal axillary lymph nodes [Normal] : normal appearance, no rash, nodules, vesicles, ulcers, erythema [de-identified] : Groins not examined [de-identified] : Below

## 2021-11-16 ENCOUNTER — APPOINTMENT (OUTPATIENT)
Dept: PLASTIC SURGERY | Facility: CLINIC | Age: 46
End: 2021-11-16
Payer: COMMERCIAL

## 2021-11-16 VITALS
DIASTOLIC BLOOD PRESSURE: 92 MMHG | BODY MASS INDEX: 29.45 KG/M2 | HEIGHT: 60 IN | SYSTOLIC BLOOD PRESSURE: 149 MMHG | HEART RATE: 84 BPM | OXYGEN SATURATION: 97 % | WEIGHT: 150 LBS | TEMPERATURE: 98.2 F

## 2021-11-16 DIAGNOSIS — N64.59 OTHER SIGNS AND SYMPTOMS IN BREAST: ICD-10-CM

## 2021-11-16 PROCEDURE — 99024 POSTOP FOLLOW-UP VISIT: CPT

## 2021-11-24 LAB
CULTURE RESULTS: SIGNIFICANT CHANGE UP
SPECIMEN SOURCE: SIGNIFICANT CHANGE UP

## 2021-12-13 ENCOUNTER — APPOINTMENT (OUTPATIENT)
Dept: SURGICAL ONCOLOGY | Facility: CLINIC | Age: 46
End: 2021-12-13
Payer: COMMERCIAL

## 2021-12-13 VITALS
WEIGHT: 150 LBS | TEMPERATURE: 98.3 F | SYSTOLIC BLOOD PRESSURE: 141 MMHG | HEART RATE: 85 BPM | HEIGHT: 60 IN | OXYGEN SATURATION: 99 % | BODY MASS INDEX: 29.45 KG/M2 | DIASTOLIC BLOOD PRESSURE: 90 MMHG | RESPIRATION RATE: 15 BRPM

## 2021-12-13 PROCEDURE — 99024 POSTOP FOLLOW-UP VISIT: CPT

## 2021-12-15 LAB
CULTURE RESULTS: SIGNIFICANT CHANGE UP
SPECIMEN SOURCE: SIGNIFICANT CHANGE UP

## 2022-01-05 ENCOUNTER — NON-APPOINTMENT (OUTPATIENT)
Age: 47
End: 2022-01-05

## 2022-01-05 ENCOUNTER — APPOINTMENT (OUTPATIENT)
Dept: INTERNAL MEDICINE | Facility: CLINIC | Age: 47
End: 2022-01-05
Payer: COMMERCIAL

## 2022-01-05 DIAGNOSIS — N95.1 MENOPAUSAL AND FEMALE CLIMACTERIC STATES: ICD-10-CM

## 2022-01-05 PROCEDURE — 99213 OFFICE O/P EST LOW 20 MIN: CPT | Mod: 95

## 2022-01-05 RX ORDER — OXYCODONE 5 MG/1
5 TABLET ORAL
Qty: 10 | Refills: 0 | Status: COMPLETED | COMMUNITY
Start: 2021-10-26 | End: 2022-01-05

## 2022-01-05 RX ORDER — CEFADROXIL 500 MG/1
500 CAPSULE ORAL TWICE DAILY
Qty: 20 | Refills: 0 | Status: COMPLETED | COMMUNITY
Start: 2021-10-13 | End: 2022-01-05

## 2022-01-05 RX ORDER — OXYCODONE AND ACETAMINOPHEN 2.5; 325 MG/1; MG/1
2.5-325 TABLET ORAL
Qty: 40 | Refills: 0 | Status: COMPLETED | COMMUNITY
Start: 2021-09-15 | End: 2022-01-05

## 2022-01-23 NOTE — PLAN
[FreeTextEntry1] : I recommend increasing venlafaxine ER from 75mg to 150 mg daily She will try 2 capsules of the 75g qd for the next few days..  I will contact the patient in a few days to see if she is tolerating the increase in dosage for submitting a new refill.

## 2022-01-23 NOTE — HISTORY OF PRESENT ILLNESS
[FreeTextEntry1] : Telehealth Video Encounter\par Patient Elected and consented today to a TELEHEALTH visit.\par Patient Location: Home\par Physician Location: Office at [address] [place] [address], [address],  [place] [address]  [de-identified] : Patient recently tested positive for COVID on 12/22/2021.  Her symptoms were mild to moderate -she presented with low-grade fevers, sinus and chest congestion with mild cough and postnasal drip and malaise.  Symptoms resolved over 1 to 2 weeks.  Since her COVID infection she is concerned that she has had recurrent blisters along her lips since her COVID infection.\par \par Patient also would like to discuss possibility of increasing her venlafaxine given recent increased anxiety symptoms.

## 2022-01-23 NOTE — PHYSICAL EXAM
[Normal] : affect was normal and insight and judgment were intact Spontaneous, unlabored and symmetrical

## 2022-02-04 NOTE — REASON FOR VISIT
[Post-Op] : a post-op for [Other: _____] : [unfilled] [FreeTextEntry2] : Chronic infection/inflammation of the left nipple and areolar complex

## 2022-02-04 NOTE — ASSESSMENT
[FreeTextEntry1] : Clinically doing well.\par \par \par August 2021:\par Bilateral mammogram and sonogram at [facility]: BI-RADS 3.\par She has already been given a prescription for left breast ultrasound in February 2002.\par \par \par She is due for a follow-up breast MRI in January 2022.\par This is a 6-month follow-up to a benign and concordant left breast MRI core biopsy at [facility].\par She has prescription also.\par \par \par If her imaging is normal, and she is asymptomatic, we will see her in the summer 2022, sooner if needed.\par \par \par \par 1/31/2022:\par Bilateral breast MRI at [facility]: BI-RADS 3.\par Significant improvement in skin thickening and edema of the left nipple and areolar region and adjacent skin.\par Also, peripherally enhancing masslike finding in the left retroareolar region has improved.\par 6-month follow-up MRI recommended.\par Prescription mailed 2/4/2020.

## 2022-02-04 NOTE — HISTORY OF PRESENT ILLNESS
[de-identified] : Second postoperative visit\par \par 46 year-old lady.\par \par 10/26/2021:\par Incisional biopsy of the left nipple and areolar complex for chronic, intermittently infected and inflamed, process.\par Plastics: Dr. Erwin Pena.\par Surgical pathology:\par Unremarkable skin and fatty breast tissue.\par \par We spoke 11/10/2021.\par She was having some discomfort at the surgical site.\par Her first postoperative visit was 11/15/2021:\par My PE:\par + Induration at left breast biopsy site, with no worrisome findings.\par \par She returns for scheduled follow-up today.\par She feels well.\par No constitutional or specific signs or symptoms.\par \par \par October 7, 2021 office visit:\par She felt much better with decreased pain, tenderness, and swelling in the left nipple and areolar complex region.\par No new specific or constitutional signs or symptoms.\par Her examination was normal.\par \par October 13, 2021, she called me reporting a recurrence in her left mastitis signs and symptoms.\par I advised supportive garments and warm compresses.\par I prescribed cefadroxil.\par \par At her October 18, 2021 her constitutional and local signs and symptoms had subsided.\par Understandably, she was frustrated, since she had these infections intermittently since April 2021, and this is her fifth course of antibiotics, with improvement, near resolution, but then recurrence of the infection.\par \par \par Seen 9/20/2021: With a nonsuppurative central left breast infection, with skin thickening of the nipple and areolar complex.\par \par \par Initially referred August 2021 with left nipple inversion since April 2021 (below).\par \par April 2021: Diagnostic left mammogram in  at [facility]: BI-RADS 3.\par \par Breast MRI was ordered by her internist\par July 2021 breast MRI at [facility]: BI-RADS 4.\par A peripheral left breast nodule was noted for which a targeted ultrasound was recommended.\par She initially saw Dr. Susan PALLESCHI.\par Left breast sonogram guided core biopsy at [facility]: benign and concordant: \par Chronic and focally acute inflammation associated with macrophages and rare multinucleated giant cells.\par \par \par August 2021:\par My PE confirmed the left nipple inversion.\par No other visible or palpable abnormalities noted.\par \par \par August 2021:\par Bilateral mammogram and sonogram at [facility]: BI-RADS 3.\par 1.  January 2022 breast MRI recommended, 6-month follow-up to benign core biopsy.\par 2.  February 2022 left breast sonogram recommended for short-term reevaluation of previously noted finding at 3:00..\par \par \par CC:\par September 9, 2021, the left nipple and areola became thickened.\par + Associated redness and tenderness.\par Painful and tender.\par No fever.\par Had some sweats, but not sure if related to possible infection, or due to perimenopause.\par \par September 13, 2021, she saw her internist.\par She was started on cefadroxil with improvement in her signs and symptoms, but not resolution.\par \par In retrospect, even prior to the above events (e.g. Labor Day 2021), the left nipple and areola although improved, and had never returned to normal.\par \par September 2021:\par My physical examination:\par Induration of the outer left nipple and areolar complex with associated erythema.\par Based on pictures she had from 2 weeks prior, her exam was significantly improved.\par \par \par August 2021: Referred by her internist (Dr. Jamee BRAN) with LEFT NIPPLE inversion.\par \par This was a new finding in mid April, 2021.\par \par + Associated erythema, induration, and tenderness.\par \par No precipitating cause that she is aware of.\par No other signs or symptoms related to either breast.\par \par 4/28/2021:\par Diagnostic left mammogram and sonogram at [facility]: BI-RADS 3.\par Bilateral mammogram and ultrasound recommended for July 2021.\par \par She was treated with a 10-day course of oral antibiotics with some improvement, but without resolution.\par \par The above infectious/inflammatory changes recurred at the end of May 2021\par \par \par 7/22/2021:\par Left breast (retroareolar) core biopsy at [facility]:\par Benign and concordant.\par Chronic and focally acute inflammation associated with macrophages and rare multinucleated giant cells.\par Differential diagnosis includes healing abscess, and ruptured inflamed cyst.\par \par \par 7-16-21:\par Bilateral breast MRI at [facility]: BI-RADS 4.\par 1.  2.3 x 2.5 x 2.5 cm area of diffuse edema and skin thickening of the left nipple/areola/adjacent skin, with irregular, peripherally enhancing mass with multiple internal septations.\par 2.  Left breast (3:00) 8 cm FN:\par 6 x 4 x 5 mm nodule: Targeted ultrasound recommended.\par \par 7-22-21:\par Diagnostic left breast ultrasound at [facility]:\par 1.  Above retroareolar mass confirmed.\par Core biopsy recommended.\par Benign results (above), concordant.\par 2.  NO discrete sonographic finding in the left breast (3:00).\par With the above benign pathology report, 6-month follow-up MRI has been recommended for January 2022........ \par \par \par No other procedures related to either breast.\par No other personal history of breast disease.\par \par No personal history of malignancy.\par \par Not Ashkenazi.\par \par Menarche at 9.\par Nulliparous.\par LMP: 2/3/2021.\par No exogenous hormones.\par \par \par Breast cancer risk score = 22.1.\par \par \par No relatives with breast cancer.\par No relatives with ovarian cancer.\par \par Not Ashkenazi.\par \par Family history of malignancy:\par A sister had Hodgkin's disease at 17.\par \par \par PMD: Dr. Jamee BRAN.\par \par April 2019 she had a cardiology evaluation (Dr. Jay LISKER).\par She was experiencing palpitations.\par No underlying cardiovascular disease identified..\par \par No pacemaker or defibrillator.\par No anticoagulants.\par \par + Asthma.\par No current medical therapy warranted.\par Pulmonary: Dr. Ricardo RANDALL.\par \par History of GERD, diagnosed by EGD (below).\par Currently not requiring medical therapy.\par \par \par Her only current medication is Effexor for perimenopausal symptoms.\par This was prescribed by her gynecologist.\par \par \par GYN: Dr. Roseanne STALLINGS.\par Seen in September 2021.\par She used to see Dr. Liana Danielle.\par \par \par April 2019: EGD (Dr. Jett CAZARES).\par Reflux esophagitis.\par \par She has not yet had her baseline colonoscopy

## 2022-02-04 NOTE — REVIEW OF SYSTEMS
[Negative] : Heme/Lymph [FreeTextEntry5] : History of palpitations [FreeTextEntry6] : Asthma [FreeTextEntry8] : GERD [de-identified] : Perimenopausal symptoms

## 2022-02-04 NOTE — PHYSICAL EXAM
[Normal] : supple, no neck mass and thyroid not enlarged [Normal Neck Lymph Nodes] : normal neck lymph nodes  [Normal Supraclavicular Lymph Nodes] : normal supraclavicular lymph nodes [Normal Axillary Lymph Nodes] : normal axillary lymph nodes [Normal] : normal appearance, no rash, nodules, vesicles, ulcers, erythema [de-identified] : Groins not examined [de-identified] : Below

## 2022-02-23 NOTE — DISCUSSION/SUMMARY
[FreeTextEntry1] : She is a 43-year-old who is perimenopausal and has been experiencing episodes of palpitations. There are no high-risk features described and I have suggested that she undergo a 24 Holter monitor in order to define the morphology of these extra beats. An echocardiogram would also exclude any structural abnormalities. If her heart is structurally normal and no significant arrhythmias were found I would continue to reassure her that this is not dangerous. Routine aerobic exercise was discussed and may mitigate some of her symptoms as well.\par  Bill 24178 For Specimen Handling/Conveyance To Laboratory?: no Type Of Destruction Used: Curettage Biopsy Type: H and E Anesthesia Type: 1% lidocaine with epinephrine Accession #: [id number] Wound Care: Vaseline Additional Anesthesia Volume In Cc (Will Not Render If 0): 0 Depth Of Biopsy: dermis Post-care instructions were reviewed in detail and written instructions are provided. Patient is to keep the biopsy site dry overnight, and then apply bacitracin twice daily until healed. Patient may apply hydrogen peroxide soaks to remove any crusting. Dressing: bandage Silver Nitrate Text: The wound bed was treated with silver nitrate after the biopsy was performed. Validate Note Data (See Information Below): Yes Cryotherapy Text: The wound bed was treated with cryotherapy after the biopsy was performed. Notification Instructions: Patient will be notified of biopsy results. However, patient instructed to call the office if not contacted within 2 weeks. Hemostasis: Aluminum Chloride Electrodesiccation Text: The wound bed was treated with electrodesiccation after the biopsy was performed. Consent: Written consent was obtained and risks were reviewed including but not limited to scarring, infection, bleeding, scabbing, incomplete removal, and allergy to anesthesia. Electrodesiccation And Curettage Text: The wound bed was treated with electrodesiccation and curettage after the biopsy was performed. Biopsy Method: Dermablade Anesthesia Volume In Cc: 0.3 Billing Type: Third-Party Bill Detail Level: Detailed Information: Selecting Yes will display possible errors in your note based on the variables you have selected. This validation is only offered as a suggestion for you. PLEASE NOTE THAT THE VALIDATION TEXT WILL BE REMOVED WHEN YOU FINALIZE YOUR NOTE. IF YOU WANT TO FAX A PRELIMINARY NOTE YOU WILL NEED TO TOGGLE THIS TO 'NO' IF YOU DO NOT WANT IT IN YOUR FAXED NOTE.

## 2022-03-21 ENCOUNTER — APPOINTMENT (OUTPATIENT)
Dept: SURGICAL ONCOLOGY | Facility: CLINIC | Age: 47
End: 2022-03-21
Payer: COMMERCIAL

## 2022-03-21 VITALS
BODY MASS INDEX: 29.45 KG/M2 | HEART RATE: 85 BPM | TEMPERATURE: 97.6 F | DIASTOLIC BLOOD PRESSURE: 88 MMHG | WEIGHT: 150 LBS | OXYGEN SATURATION: 99 % | HEIGHT: 60 IN | SYSTOLIC BLOOD PRESSURE: 142 MMHG | RESPIRATION RATE: 16 BRPM

## 2022-03-21 PROCEDURE — 99214 OFFICE O/P EST MOD 30 MIN: CPT

## 2022-03-21 NOTE — REASON FOR VISIT
[Follow-Up Visit] : a follow-up visit for [Other: _____] : [unfilled] [FreeTextEntry2] : Interval breast exam, increased risk of breast cancer (swathi score =22.1%)

## 2022-03-21 NOTE — ASSESSMENT
[FreeTextEntry1] : Clinically doing well.\par \par \par August 2021:\par Bilateral mammogram and sonogram at [facility]: BI-RADS 3.\par She has already been given a prescription for left breast ultrasound in February 2002.\par \par 2/14/2022:\par Left breast sonogram at [facility]: BI-RADS 2.\par Interval resolution of previous hypoechoic area in the left retroareolar region.\par \par Annual breast imaging is due August 2022.\par Prescription provided today\par \par \par \par 1/31/2022:\par Bilateral breast MRI at [facility]: BI-RADS 3.\par Significant improvement in skin thickening and edema of the left nipple and areolar region and adjacent skin.\par Also, peripherally enhancing masslike finding in the left retroareolar region has improved.\par 6-month follow-up MRI recommended.\par I have provided her with a prescription today\par \par \par Clinically doing well.\par \par If no problems, and imaging is normal, we should see her yearly, sooner if needed

## 2022-03-21 NOTE — PHYSICAL EXAM
[Normal] : supple, no neck mass and thyroid not enlarged [Normal Neck Lymph Nodes] : normal neck lymph nodes  [Normal Supraclavicular Lymph Nodes] : normal supraclavicular lymph nodes [Normal Axillary Lymph Nodes] : normal axillary lymph nodes [Normal] : normal appearance, no rash, nodules, vesicles, ulcers, erythema [de-identified] : Groins not examined [de-identified] : Below

## 2022-03-21 NOTE — HISTORY OF PRESENT ILLNESS
[de-identified] : 46 year-old lady.\par \par Follow-up breast exam.\par \par 10/26/2021:\par Incisional biopsy of the left nipple and areolar complex for chronic, intermittently infected and inflamed, process.\par Plastics: Dr. Erwin Pena.\par Surgical pathology:\par Unremarkable skin and fatty breast tissue.\par \par We spoke 11/10/2021.\par She was having some discomfort at the surgical site.\par Her first postoperative visit was 11/15/2021:\par My PE:\par + Induration at left breast biopsy site, with no worrisome findings.\par \par 12/13/2021:\par Schedule follow-up visit\par She felt well.\par No constitutional or specific signs or symptoms.\par My PE: Minimal residual induration, no other inflammatory or infectious changes.\par \par \par CC: Occasional discomfort at the surgical site in the left breast.\par No other signs or symptoms related to either breast.\par \par \par Initially referred August 2021 with left nipple inversion since April 2021 (below).\par \par April 2021: Diagnostic left mammogram in  at [facility]: BI-RADS 3.\par \par Breast MRI was ordered by her internist\par July 2021 breast MRI at [facility]: BI-RADS 4.\par A peripheral left breast nodule was noted for which a targeted ultrasound was recommended.\par She initially saw Dr. Susan PALLESCHI.\par Left breast sonogram guided core biopsy at [facility]: benign and concordant: \par Chronic and focally acute inflammation associated with macrophages and rare multinucleated giant cells.\par \par \par August 2021:\par My PE confirmed the left nipple inversion.\par No other visible or palpable abnormalities noted.\par \par \par August 2021:\par Bilateral mammogram and sonogram at [facility]: BI-RADS 3.\par 1.  January 2022 breast MRI recommended, 6-month follow-up to benign core biopsy.\par 2.  February 2022 left breast sonogram recommended for short-term reevaluation of previously noted finding at 3:00..\par \par \par CC:\par September 9, 2021, the left nipple and areola became thickened.\par + Associated redness and tenderness.\par Painful and tender.\par No fever.\par Had some sweats, but not sure if related to possible infection, or due to perimenopause.\par \par September 13, 2021, she saw her internist.\par She was started on cefadroxil with improvement in her signs and symptoms, but not resolution.\par \par In retrospect, even prior to the above events (e.g. Labor Day 2021), the left nipple and areola although improved, and had never returned to normal.\par \par September 2021:\par My physical examination:\par Induration of the outer left nipple and areolar complex with associated erythema.\par Based on pictures she had from 2 weeks prior, her exam was significantly improved.\par \par \par August 2021: Referred by her internist (Dr. Jamee BRAN) with LEFT NIPPLE inversion.\par \par This was a new finding in mid April, 2021.\par \par + Associated erythema, induration, and tenderness.\par \par No precipitating cause that she is aware of.\par No other signs or symptoms related to either breast.\par \par 4/28/2021:\par Diagnostic left mammogram and sonogram at [facility]: BI-RADS 3.\par Bilateral mammogram and ultrasound recommended for July 2021.\par \par She was treated with a 10-day course of oral antibiotics with some improvement, but without resolution.\par \par The above infectious/inflammatory changes recurred at the end of May 2021\par \par \par 7/22/2021:\par Left breast (retroareolar) core biopsy at [facility]:\par Benign and concordant.\par Chronic and focally acute inflammation associated with macrophages and rare multinucleated giant cells.\par Differential diagnosis includes healing abscess, and ruptured inflamed cyst.\par \par \par 7-16-21:\par Bilateral breast MRI at [facility]: BI-RADS 4.\par 1.  2.3 x 2.5 x 2.5 cm area of diffuse edema and skin thickening of the left nipple/areola/adjacent skin, with irregular, peripherally enhancing mass with multiple internal septations.\par 2.  Left breast (3:00) 8 cm FN:\par 6 x 4 x 5 mm nodule: Targeted ultrasound recommended.\par \par 7-22-21:\par Diagnostic left breast ultrasound at [facility]:\par 1.  Above retroareolar mass confirmed.\par Core biopsy recommended.\par Benign results (above), concordant.\par 2.  NO discrete sonographic finding in the left breast (3:00).\par With the above benign pathology report, 6-month follow-up MRI has been recommended for January 2022........ \par \par \par No other procedures related to either breast.\par No other personal history of breast disease.\par \par No personal history of malignancy.\par \par Not Ashkenazi.\par \par Menarche at 9.\par Nulliparous.\par LMP: 2/3/2021.\par No exogenous hormones.\par \par \par Breast cancer risk score = 22.1.\par \par \par No relatives with breast cancer.\par No relatives with ovarian cancer.\par \par Not Ashkenazi.\par \par Family history of malignancy:\par A sister had Hodgkin's disease at 17.\par \par \par PMD: Dr. Jamee BRAN.\par \par April 2019 she had a cardiology evaluation (Dr. Jay LISKER).\par She was experiencing palpitations.\par No underlying cardiovascular disease identified..\par \par No pacemaker or defibrillator.\par No anticoagulants.\par \par + Asthma.\par No current medical therapy warranted.\par Pulmonary: Dr. Ricardo RANDALL.\par \par History of GERD, diagnosed by EGD (below).\par Currently not requiring medical therapy.\par \par \par Her only current medication is Effexor for perimenopausal symptoms.\par This was prescribed by her gynecologist.\par \par \par GYN: Dr. Roseanne STALLINGS.\par Seen in September 2021.\par She used to see Dr. Liana Danielle.\par \par \par April 2019: EGD (Dr. Jett CAZARES).\par Reflux esophagitis.\par \par She has not yet had her baseline colonoscopy.\par Given the current recommendation that screening commence at age 45, I suggested that she consider having 1.\par She is taking this on advisement

## 2022-03-21 NOTE — REVIEW OF SYSTEMS
[Negative] : Musculoskeletal [FreeTextEntry5] : History of palpitations [FreeTextEntry6] : Asthma [FreeTextEntry8] : Reflux [de-identified] : History of chronic soft tissue infection of the left breast [de-identified] : Perimenopausal symptoms [FreeTextEntry1] : Increased risk of breast cancer

## 2022-04-04 ENCOUNTER — APPOINTMENT (OUTPATIENT)
Dept: INTERNAL MEDICINE | Facility: CLINIC | Age: 47
End: 2022-04-04
Payer: COMMERCIAL

## 2022-04-04 ENCOUNTER — LABORATORY RESULT (OUTPATIENT)
Age: 47
End: 2022-04-04

## 2022-04-04 ENCOUNTER — NON-APPOINTMENT (OUTPATIENT)
Age: 47
End: 2022-04-04

## 2022-04-04 VITALS
DIASTOLIC BLOOD PRESSURE: 84 MMHG | BODY MASS INDEX: 28.71 KG/M2 | HEIGHT: 62 IN | SYSTOLIC BLOOD PRESSURE: 138 MMHG | WEIGHT: 156 LBS

## 2022-04-04 PROCEDURE — 93000 ELECTROCARDIOGRAM COMPLETE: CPT

## 2022-04-04 PROCEDURE — 99396 PREV VISIT EST AGE 40-64: CPT | Mod: 25

## 2022-04-04 PROCEDURE — 36415 COLL VENOUS BLD VENIPUNCTURE: CPT

## 2022-04-04 RX ORDER — ESCITALOPRAM OXALATE 10 MG/1
10 TABLET ORAL DAILY
Qty: 30 | Refills: 1 | Status: COMPLETED | COMMUNITY
Start: 2020-07-16 | End: 2022-04-04

## 2022-04-04 RX ORDER — OXYCODONE AND ACETAMINOPHEN 5; 325 MG/1; MG/1
5-325 TABLET ORAL
Qty: 40 | Refills: 0 | Status: COMPLETED | COMMUNITY
Start: 2021-09-15 | End: 2022-04-04

## 2022-04-04 RX ORDER — FLUCONAZOLE 150 MG/1
150 TABLET ORAL DAILY
Qty: 3 | Refills: 3 | Status: COMPLETED | COMMUNITY
Start: 2020-10-13 | End: 2022-04-04

## 2022-04-04 RX ORDER — NYSTATIN 100000 [USP'U]/G
100000 CREAM TOPICAL
Qty: 30 | Refills: 0 | Status: COMPLETED | COMMUNITY
Start: 2022-03-31

## 2022-04-04 NOTE — HEALTH RISK ASSESSMENT
[Patient reported mammogram was normal] : Patient reported mammogram was normal [Patient reported PAP Smear was normal] : Patient reported PAP Smear was normal [HIV test declined] : HIV test declined [Hepatitis C test declined] : Hepatitis C test declined [Good] : ~his/her~  mood as  good [Never] : Never [2 - 4 times a month (2 pts)] : 2-4 times a month (2 points) [Never (0 pts)] : Never (0 points) [No] : In the past 12 months have you used drugs other than those required for medical reasons? No [0] : 2) Feeling down, depressed, or hopeless: Not at all (0) [de-identified] : now sees surgical oncologist Dr. Maldonado q 6months [de-identified] : socially 2 times a month [Audit-CScore] : 0 [de-identified] : walks daily [de-identified] : overall healthy balanced and varied diet without any exclusions  [[id number]] : 1 [MammogramDate] : 08/21 [MammogramComments] : -also recently had MRI and US of breasts per Dr. Maldonado [PapSmearDate] : 09/21 [PapSmearComments] : with Dr. Danielle- LMP was 2/03/2021 -cycles are irregular [ColonoscopyComments] : will schedule

## 2022-04-04 NOTE — HISTORY OF PRESENT ILLNESS
[FreeTextEntry1] : Pt. presents for a comprehensive evaluation for multiple medical issues.\par  [de-identified] : Patient reports she has been in overall good physical health. She is maintaining a  healthier diet and increased walking since last year.\par \par She recently purchased her coop and will be closing on her apartment this month.\par \par However, she reports chronic anxiety with stress of caring for her mother who suffered multiple medical issues her mother required 2 surgeries in the past 2 years..  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

## 2022-04-04 NOTE — DATA REVIEWED
[FreeTextEntry1] : EKG today was within normal limits\par  NSR at 84 BPM- no change from baseline EKG from 3/15/2021

## 2022-04-04 NOTE — REVIEW OF SYSTEMS
[Negative] : Heme/Lymph [Hot Flashes] : hot flashes [Insomnia] : insomnia [Anxiety] : anxiety [Depression] : depression [Recent Change In Weight] : ~T no recent weight change [FreeTextEntry2] : weight loss

## 2022-04-04 NOTE — PHYSICAL EXAM
[Normal Appearance] : normal in appearance [No Axillary Lymphadenopathy] : no axillary lymphadenopathy [Normal] : affect was normal and insight and judgment were intact [No Acute Distress] : no acute distress [Well Nourished] : well nourished [Well Developed] : well developed [Well-Appearing] : well-appearing [Normal Sclera/Conjunctiva] : normal sclera/conjunctiva [PERRL] : pupils equal round and reactive to light [EOMI] : extraocular movements intact [Normal Outer Ear/Nose] : the outer ears and nose were normal in appearance [No JVD] : no jugular venous distention [No Lymphadenopathy] : no lymphadenopathy [Supple] : supple [Thyroid Normal, No Nodules] : the thyroid was normal and there were no nodules present [No Respiratory Distress] : no respiratory distress  [No Accessory Muscle Use] : no accessory muscle use [Clear to Auscultation] : lungs were clear to auscultation bilaterally [Normal Rate] : normal rate  [Regular Rhythm] : with a regular rhythm [Normal S1, S2] : normal S1 and S2 [No Murmur] : no murmur heard [No Carotid Bruits] : no carotid bruits [No Abdominal Bruit] : a ~M bruit was not heard ~T in the abdomen [No Varicosities] : no varicosities [Pedal Pulses Present] : the pedal pulses are present [No Edema] : there was no peripheral edema [No Palpable Aorta] : no palpable aorta [No Extremity Clubbing/Cyanosis] : no extremity clubbing/cyanosis [Soft] : abdomen soft [Non Tender] : non-tender [Non-distended] : non-distended [No Masses] : no abdominal mass palpated [No HSM] : no HSM [Normal Bowel Sounds] : normal bowel sounds [Normal Posterior Cervical Nodes] : no posterior cervical lymphadenopathy [Normal Anterior Cervical Nodes] : no anterior cervical lymphadenopathy [No CVA Tenderness] : no CVA  tenderness [No Spinal Tenderness] : no spinal tenderness [No Joint Swelling] : no joint swelling [Grossly Normal Strength/Tone] : grossly normal strength/tone [No Rash] : no rash [Coordination Grossly Intact] : coordination grossly intact [No Focal Deficits] : no focal deficits [Normal Gait] : normal gait [Deep Tendon Reflexes (DTR)] : deep tendon reflexes were 2+ and symmetric [Normal Affect] : the affect was normal [Normal Insight/Judgement] : insight and judgment were intact [de-identified] : s/p right breast surgical scar

## 2022-04-07 LAB
25(OH)D3 SERPL-MCNC: 22.4 NG/ML
ALBUMIN SERPL ELPH-MCNC: 4.2 G/DL
ALP BLD-CCNC: 86 U/L
ALT SERPL-CCNC: 24 U/L
ANION GAP SERPL CALC-SCNC: 11 MMOL/L
APPEARANCE: CLEAR
AST SERPL-CCNC: 22 U/L
BASOPHILS # BLD AUTO: 0.06 K/UL
BASOPHILS NFR BLD AUTO: 0.8 %
BILIRUB SERPL-MCNC: <0.2 MG/DL
BILIRUBIN URINE: NEGATIVE
BLOOD URINE: NEGATIVE
BUN SERPL-MCNC: 8 MG/DL
CALCIUM SERPL-MCNC: 9.2 MG/DL
CHLORIDE SERPL-SCNC: 104 MMOL/L
CHOLEST SERPL-MCNC: 191 MG/DL
CO2 SERPL-SCNC: 22 MMOL/L
COLOR: NORMAL
CREAT SERPL-MCNC: 0.63 MG/DL
EGFR: 110 ML/MIN/1.73M2
EOSINOPHIL # BLD AUTO: 0.16 K/UL
EOSINOPHIL NFR BLD AUTO: 2.2 %
ERYTHROCYTE [SEDIMENTATION RATE] IN BLOOD BY WESTERGREN METHOD: 5 MM/HR
ESTIMATED AVERAGE GLUCOSE: 111 MG/DL
ESTRADIOL SERPL-MCNC: 18 PG/ML
FSH SERPL-MCNC: 53 IU/L
GLUCOSE QUALITATIVE U: NEGATIVE
GLUCOSE SERPL-MCNC: 84 MG/DL
HBA1C MFR BLD HPLC: 5.5 %
HCT VFR BLD CALC: 44.4 %
HDLC SERPL-MCNC: 52 MG/DL
HGB BLD-MCNC: 14 G/DL
IMM GRANULOCYTES NFR BLD AUTO: 0.1 %
KETONES URINE: NEGATIVE
LDLC SERPL CALC-MCNC: 130 MG/DL
LDLC SERPL DIRECT ASSAY-MCNC: 122 MG/DL
LEUKOCYTE ESTERASE URINE: NEGATIVE
LH SERPL-ACNC: 21.3 IU/L
LYMPHOCYTES # BLD AUTO: 3.03 K/UL
LYMPHOCYTES NFR BLD AUTO: 41.1 %
MAN DIFF?: NORMAL
MCHC RBC-ENTMCNC: 30.4 PG
MCHC RBC-ENTMCNC: 31.5 GM/DL
MCV RBC AUTO: 96.3 FL
MONOCYTES # BLD AUTO: 0.49 K/UL
MONOCYTES NFR BLD AUTO: 6.6 %
NEUTROPHILS # BLD AUTO: 3.63 K/UL
NEUTROPHILS NFR BLD AUTO: 49.2 %
NITRITE URINE: NEGATIVE
NONHDLC SERPL-MCNC: 139 MG/DL
PH URINE: 7
PLATELET # BLD AUTO: 337 K/UL
POTASSIUM SERPL-SCNC: 4.6 MMOL/L
PROT SERPL-MCNC: 7.2 G/DL
PROTEIN URINE: NEGATIVE
RBC # BLD: 4.61 M/UL
RBC # FLD: 11.9 %
SODIUM SERPL-SCNC: 136 MMOL/L
SPECIFIC GRAVITY URINE: 1.01
T3 SERPL-MCNC: 88 NG/DL
T3FREE SERPL-MCNC: 2.56 PG/ML
T3RU NFR SERPL: 1.1 TBI
T4 FREE SERPL-MCNC: 1 NG/DL
T4 SERPL-MCNC: 5.1 UG/DL
TRIGL SERPL-MCNC: 45 MG/DL
TSH SERPL-ACNC: 0.72 UIU/ML
UROBILINOGEN URINE: NORMAL
WBC # FLD AUTO: 7.38 K/UL

## 2022-04-11 PROBLEM — Z11.59 SCREENING FOR VIRAL DISEASE: Status: ACTIVE | Noted: 2020-07-16

## 2022-04-25 ENCOUNTER — RX RENEWAL (OUTPATIENT)
Age: 47
End: 2022-04-25

## 2022-05-09 RX ORDER — VENLAFAXINE HYDROCHLORIDE 150 MG/1
150 CAPSULE, EXTENDED RELEASE ORAL DAILY
Qty: 90 | Refills: 3 | Status: COMPLETED | COMMUNITY
End: 2022-05-09

## 2022-06-12 PROBLEM — N64.59 ABNORMAL BREAST EXAM: Status: ACTIVE | Noted: 2021-06-28

## 2022-06-13 ENCOUNTER — APPOINTMENT (OUTPATIENT)
Dept: SURGICAL ONCOLOGY | Facility: CLINIC | Age: 47
End: 2022-06-13
Payer: COMMERCIAL

## 2022-06-13 VITALS
BODY MASS INDEX: 28.16 KG/M2 | WEIGHT: 153 LBS | TEMPERATURE: 96.9 F | OXYGEN SATURATION: 98 % | DIASTOLIC BLOOD PRESSURE: 85 MMHG | RESPIRATION RATE: 17 BRPM | SYSTOLIC BLOOD PRESSURE: 124 MMHG | HEART RATE: 84 BPM | HEIGHT: 62 IN

## 2022-06-13 DIAGNOSIS — N64.59 OTHER SIGNS AND SYMPTOMS IN BREAST: ICD-10-CM

## 2022-06-13 PROCEDURE — 99214 OFFICE O/P EST MOD 30 MIN: CPT

## 2022-06-13 NOTE — REASON FOR VISIT
[Follow-Up Visit] : a follow-up visit for [Other: _____] : [unfilled] [FreeTextEntry2] : Follow-up breast examination, breast cancer risk score = 22.1

## 2022-06-13 NOTE — REVIEW OF SYSTEMS
[Negative] : Integumentary [FreeTextEntry6] : Asthma [FreeTextEntry7] : Recurrent breast infections [FreeTextEntry8] : Reflux [de-identified] : Perimenopausal symptoms [FreeTextEntry1] : Increased risk of breast cancer

## 2022-06-13 NOTE — ASSESSMENT
[FreeTextEntry1] : Today she is asymptomatic with a normal breast examination.\par \par \par August 2021:\par Bilateral mammogram and sonogram at [facility]: BI-RADS 3.\par \par 2/14/2022:\par Left breast sonogram at [facility]: BI-RADS 2.\par Interval resolution of previous hypoechoic area in the left retroareolar region.\par \par Annual breast imaging is due August 2022.\par Prescription provided at her March 2022 visit\par \par \par \par 1/31/2022:\par Bilateral breast MRI at [facility]: BI-RADS 3.\par Significant improvement in skin thickening and edema of the left nipple and areolar region and adjacent skin.\par Also, peripherally enhancing masslike finding in the left retroareolar region has improved.\par 6-month follow-up MRI recommended for July 2022.\par I have provided her with a prescription at her March 2022 visit\par \par

## 2022-06-13 NOTE — PHYSICAL EXAM
[Normal] : supple, no neck mass and thyroid not enlarged [Normal Neck Lymph Nodes] : normal neck lymph nodes  [Normal Supraclavicular Lymph Nodes] : normal supraclavicular lymph nodes [Normal Axillary Lymph Nodes] : normal axillary lymph nodes [Normal] : normal appearance, no rash, nodules, vesicles, ulcers, erythema [de-identified] : Groins not examined [de-identified] : Below

## 2022-06-13 NOTE — HISTORY OF PRESENT ILLNESS
[de-identified] : 46 year-old lady.\par \par Follow-up breast exam.\par \par Today she is asymptomatic.\par \par \par 10/26/2021:\par Incisional biopsy of the left nipple and areolar complex for chronic, intermittently infected and inflamed, process.\par Plastics: Dr. Erwin Pena.\par Surgical pathology:\par Unremarkable skin and fatty breast tissue.\par \par We spoke 11/10/2021.\par She was having some discomfort at the surgical site.\par Her first postoperative visit was 11/15/2021:\par My PE:\par + Induration at left breast biopsy site, with no worrisome findings.\par \par 12/13/2021:\par Schedule follow-up visit\par She felt well.\par No constitutional or specific signs or symptoms.\par My PE: Minimal residual induration, no other inflammatory or infectious changes.\par \par \par CC: Occasional discomfort at the surgical site in the left breast.\par No other signs or symptoms related to either breast.\par \par \par Initially referred August 2021 with left nipple inversion since April 2021 (below).\par \par April 2021: Diagnostic left mammogram in  at [facility]: BI-RADS 3.\par \par Breast MRI was ordered by her internist\par July 2021 breast MRI at [facility]: BI-RADS 4.\par A peripheral left breast nodule was noted for which a targeted ultrasound was recommended.\par She initially saw Dr. Susan PALLESCHI.\par Left breast sonogram guided core biopsy at [facility]: benign and concordant: \par Chronic and focally acute inflammation associated with macrophages and rare multinucleated giant cells.\par \par \par August 2021:\par My PE confirmed the left nipple inversion.\par No other visible or palpable abnormalities noted.\par \par \par August 2021:\par Bilateral mammogram and sonogram at [facility]: BI-RADS 3.\par 1.  January 2022 breast MRI recommended, 6-month follow-up to benign core biopsy.\par 2.  February 2022 left breast sonogram recommended for short-term reevaluation of previously noted finding at 3:00..\par \par \par September 9, 2021, the left nipple and areola became thickened.\par + Associated redness and tenderness.\par Painful and tender.\par No fever.\par Had some sweats, but not sure if related to possible infection, or due to perimenopause.\par \par September 13, 2021, she saw her internist.\par She was started on cefadroxil with improvement in her signs and symptoms, but not resolution.\par \par In retrospect, even prior to the above events (e.g. Labor Day 2021), the left nipple and areola although improved, and had never returned to normal.\par \par September 2021:\par My physical examination:\par Induration of the outer left nipple and areolar complex with associated erythema.\par Based on pictures she had from 2 weeks prior, her exam was significantly improved.\par \par \par August 2021: Referred by her internist (Dr. Jamee BRAN) with LEFT NIPPLE inversion.\par \par This was a new finding in mid April, 2021.\par \par + Associated erythema, induration, and tenderness.\par \par No precipitating cause that she is aware of.\par No other signs or symptoms related to either breast.\par \par 4/28/2021:\par Diagnostic left mammogram and sonogram at [facility]: BI-RADS 3.\par Bilateral mammogram and ultrasound recommended for July 2021.\par \par She was treated with a 10-day course of oral antibiotics with some improvement, but without resolution.\par \par The above infectious/inflammatory changes recurred at the end of May 2021\par \par \par 7/22/2021:\par Left breast (retroareolar) core biopsy at [facility]:\par Benign and concordant.\par Chronic and focally acute inflammation associated with macrophages and rare multinucleated giant cells.\par Differential diagnosis includes healing abscess, and ruptured inflamed cyst.\par \par \par 7-16-21:\par Bilateral breast MRI at [facility]: BI-RADS 4.\par 1.  2.3 x 2.5 x 2.5 cm area of diffuse edema and skin thickening of the left nipple/areola/adjacent skin, with irregular, peripherally enhancing mass with multiple internal septations.\par 2.  Left breast (3:00) 8 cm FN:\par 6 x 4 x 5 mm nodule: Targeted ultrasound recommended.\par \par 7-22-21:\par Diagnostic left breast ultrasound at [facility]:\par 1.  Above retroareolar mass confirmed.\par Core biopsy recommended.\par Benign results (above), concordant.\par 2.  NO discrete sonographic finding in the left breast (3:00).\par With the above benign pathology report, 6-month follow-up MRI has been recommended for January 2022........ \par \par \par No other procedures related to either breast.\par No other personal history of breast disease.\par \par No personal history of malignancy.\par \par Not Ashkenazi.\par \par Menarche at 9.\par Nulliparous.\par LMP: 2/3/2021.\par No exogenous hormones.\par \par \par Breast cancer risk score = 22.1.\par \par \par No relatives with breast cancer.\par No relatives with ovarian cancer.\par \par Not Ashkenazi.\par \par Family history of malignancy:\par A sister had Hodgkin's disease at 17.\par \par \par PMD: Dr. Jamee BRAN.\par \par April 2019 she had a cardiology evaluation (Dr. Jay LISKER).\par She was experiencing palpitations.\par No underlying cardiovascular disease identified..\par \par No pacemaker or defibrillator.\par No anticoagulants.\par \par + Asthma.\par No current medical therapy warranted.\par Pulmonary: Dr. Ricardo RANDALL.\par \par History of GERD, diagnosed by EGD (below).\par Currently not requiring medical therapy.\par \par \par Her only current medication is Effexor for perimenopausal symptoms.\par This was prescribed by her gynecologist.\par \par \par GYN: Dr. Roseanne STALLINGS.\par Seen in September 2021.\par She used to see Dr. Liana Danielle.\par \par \par April 2019: EGD (Dr. Jett CAZARES).\par Reflux esophagitis.\par \par She has not yet had her baseline colonoscopy.\par Given the current recommendation that screening commence at age 45, I suggested that she consider having one.\par She is taking this on advisement

## 2022-08-24 ENCOUNTER — APPOINTMENT (OUTPATIENT)
Dept: INTERNAL MEDICINE | Facility: CLINIC | Age: 47
End: 2022-08-24

## 2022-08-24 VITALS
TEMPERATURE: 97.6 F | SYSTOLIC BLOOD PRESSURE: 114 MMHG | HEIGHT: 62 IN | WEIGHT: 156 LBS | DIASTOLIC BLOOD PRESSURE: 70 MMHG | BODY MASS INDEX: 28.71 KG/M2

## 2022-08-24 DIAGNOSIS — G47.00 INSOMNIA, UNSPECIFIED: ICD-10-CM

## 2022-08-24 DIAGNOSIS — F32.A DEPRESSION, UNSPECIFIED: ICD-10-CM

## 2022-08-24 DIAGNOSIS — N95.1 MENOPAUSAL AND FEMALE CLIMACTERIC STATES: ICD-10-CM

## 2022-08-24 PROCEDURE — 99214 OFFICE O/P EST MOD 30 MIN: CPT

## 2022-09-10 NOTE — HISTORY OF PRESENT ILLNESS
[FreeTextEntry1] : Patient presents for follow up evaluation for multiple medical concerns including:\par Difficulty weaning off the Effexor XR over the past few months with increased anxiety and mild depression symptoms, and worsening insomnia. [de-identified] : Over the past 2 months patient slowly weaned off her Effexor XR from 150 mg daily down to no medications over a period of 6 to 8 weeks.  Since coming off the Effexor she reports increased anxiety symptoms along with worsening hot flashes and perimenopausal symptoms.  She also has been more emotionally labile and feeling depressed during the day.\par \par She has chronic difficulty sleeping since menopause.  When she was on Effexor she did not notice any improvement in her chronic sleep issues.  She was afraid to try Ambien but may consider it now given persistent symptoms.  She tried Xanax 2.0 mg in the past but only gets 1 to 2 hours of sleep at best.

## 2022-09-23 ENCOUNTER — APPOINTMENT (OUTPATIENT)
Dept: GASTROENTEROLOGY | Facility: CLINIC | Age: 47
End: 2022-09-23

## 2022-09-29 ENCOUNTER — APPOINTMENT (OUTPATIENT)
Dept: GASTROENTEROLOGY | Facility: CLINIC | Age: 47
End: 2022-09-29

## 2022-09-29 VITALS
BODY MASS INDEX: 29.44 KG/M2 | OXYGEN SATURATION: 97 % | HEART RATE: 74 BPM | SYSTOLIC BLOOD PRESSURE: 122 MMHG | HEIGHT: 62 IN | DIASTOLIC BLOOD PRESSURE: 70 MMHG | TEMPERATURE: 98.1 F | WEIGHT: 160 LBS

## 2022-09-29 DIAGNOSIS — R14.2 ERUCTATION: ICD-10-CM

## 2022-09-29 DIAGNOSIS — R11.10 VOMITING, UNSPECIFIED: ICD-10-CM

## 2022-09-29 DIAGNOSIS — R14.0 ABDOMINAL DISTENSION (GASEOUS): ICD-10-CM

## 2022-09-29 PROCEDURE — 99204 OFFICE O/P NEW MOD 45 MIN: CPT

## 2022-09-29 NOTE — REVIEW OF SYSTEMS
[As Noted in HPI] : as noted in HPI [Fever] : no fever [Chills] : no chills [Red Eyes] : eyes not red [Chest Pain] : no chest pain [Shortness Of Breath] : no shortness of breath [Pelvic Pain] : no pelvic pain [Easy Bleeding] : no tendency for easy bleeding [Easy Bruising] : no tendency for easy bruising

## 2022-09-29 NOTE — ASSESSMENT
[FreeTextEntry1] : 1. GERD, bloating , regurgitation of food, not improved on ppi, recommend egd to r/o esophagitis, esophageal diverticulum, gastritis etc.\par \par Discussed risks including but not limited to bleeding,infection,drug reaction, perforation,missed lesion,benefits and alternatives of colonoscopy/egd with patient  including no\par treatment and patient consents to procedure.\par \par plan egd to be scheduled\par      try h2 blocker bid instead of ppi\par       low fodmap diet\par         probiotics\par          increase exercise\par         antireflux diet\par \par \par 2. average risk for colon cancer, recommend colonoscopy for screening\par \par plan colonoscopy to be scheduled\par

## 2022-09-29 NOTE — HISTORY OF PRESENT ILLNESS
[FreeTextEntry1] : 48 yo female with c/o gas and bloating , and belching, and patient saw dentist and told maybe has gerd.\par RAre gerd. no n/v. no dysphagia. regurgitation of food. no weight loss. irregluar bms, large soft brown, no brbpr, no melena. \par Takes ppi daily with minimal relief\par \par no family h/o colon or gastric cancer\par h/o egd many years ago\par

## 2022-09-29 NOTE — PHYSICAL EXAM
[No Edema] : no edema [No CVA Tenderness] : no CVA  tenderness [Abnormal Walk] : normal gait [Normal Color / Pigmentation] : normal skin color and pigmentation [Normal] : oriented to person, place, and time

## 2022-10-11 ENCOUNTER — RX RENEWAL (OUTPATIENT)
Age: 47
End: 2022-10-11

## 2022-10-26 LAB — SARS-COV-2 N GENE NPH QL NAA+PROBE: NOT DETECTED

## 2022-10-28 ENCOUNTER — APPOINTMENT (OUTPATIENT)
Dept: GASTROENTEROLOGY | Facility: CLINIC | Age: 47
End: 2022-10-28
Payer: COMMERCIAL

## 2022-10-28 ENCOUNTER — LABORATORY RESULT (OUTPATIENT)
Age: 47
End: 2022-10-28

## 2022-10-28 PROCEDURE — 43239 EGD BIOPSY SINGLE/MULTIPLE: CPT

## 2022-10-28 PROCEDURE — 45378 DIAGNOSTIC COLONOSCOPY: CPT

## 2022-10-28 PROCEDURE — A4550 SURGICAL TRAYS: CPT

## 2022-12-12 NOTE — HISTORY OF PRESENT ILLNESS
[de-identified] : First postoperative visit\par \par 46-year-old lady.\par \par 10/26/2021:\par Incisional biopsy of the left nipple and areolar complex for chronic, intermittently infected and inflamed, process.\par Plastics: Dr. Erwin Pena.\par Surgical pathology:\par Unremarkable skin and fatty breast tissue.\par \par We spoke 11/10/2021.\par She was having some discomfort at the surgical site.\par At my suggestion she presents for a postoperative visit with me.\par \par \par October 7, 2021 office visit:\par She was feeling much better today with decreased pain, tenderness, and swelling in the left nipple and areolar complex region.\par No new specific or constitutional signs or symptoms.\par Her examination is normal.\par \par October 13, 2021, she called me reporting a recurrence in her left mastitis signs and symptoms.\par I advised supportive garments and warm compresses.\par I prescribed cefadroxil.\par \par She presents for reexamination.\par \par Currently, no specific or constitutional signs or symptoms.\par \par \par \par At her October 18, 2021 her constitutional and local signs and symptoms had subsided.\par Understandably, she was frustrated, since she had these infections intermittently since April 2021, and this is her fifth course of antibiotics, with improvement, near resolution, but then recurrence of the infection.\par \par \par Seen 9/20/2021: With a nonsuppurative central left breast infection, with skin thickening of the nipple and areolar complex.\par \par \par Initially referred August 2021 with left nipple inversion since April 2021 (below).\par \par April 2021: Diagnostic left mammogram in  at [facility]: BI-RADS 3.\par \par Breast MRI was ordered by her internist\par July 2021 breast MRI at [facility]: BI-RADS 4.\par A peripheral left breast nodule was noted for which a targeted ultrasound was recommended.\par She initially saw Dr. Susan PALLESCHI.\par Left breast sonogram guided core biopsy at [facility]: benign and concordant: \par Chronic and focally acute inflammation associated with macrophages and rare multinucleated giant cells.\par \par \par August 2021:\par My PE confirmed the left nipple inversion.\par No other visible or palpable abnormalities noted.\par \par \par August 2021:\par Bilateral mammogram and sonogram at [facility]: BI-RADS 3.\par 1.  January 2022 breast MRI recommended, 6-month follow-up to benign core biopsy.\par 2.  February 2022 left breast sonogram recommended for short-term reevaluation of previously noted finding at 3:00..\par \par \par CC:\par September 9, 2021, the left nipple and areola became thickened.\par + Associated redness and tenderness.\par Painful and tender.\par No fever.\par Had some sweats, but not sure if related to possible infection, or due to perimenopause.\par \par September 13, 2021, she saw her internist.\par She was started on cefadroxil with improvement in her signs and symptoms, but not resolution.\par \par In retrospect, even prior to the above events (e.g. Labor Day 2021), the left nipple and areola although improved, and had never returned to normal.\par \par September 2021:\par My physical examination:\par Induration of the outer left nipple and areolar complex with associated erythema.\par Based on pictures she had from 2 weeks prior, her exam was significantly improved.\par \par \par August 2021: Referred by her internist (Dr. Jamee BRAN) with LEFT NIPPLE inversion.\par \par This was a new finding in mid April, 2021.\par \par + Associated erythema, induration, and tenderness.\par \par No precipitating cause that she is aware of.\par No other signs or symptoms related to either breast.\par \par 4/28/2021:\par Diagnostic left mammogram and sonogram at [facility]: BI-RADS 3.\par Bilateral mammogram and ultrasound recommended for July 2021.\par \par She was treated with a 10-day course of oral antibiotics with some improvement, but without resolution.\par \par The above infectious/inflammatory changes recurred at the end of May 2021\par \par \par 7/22/2021:\par Left breast (retroareolar) core biopsy at [facility]:\par Benign and concordant.\par Chronic and focally acute inflammation associated with macrophages and rare multinucleated giant cells.\par Differential diagnosis includes healing abscess, and ruptured inflamed cyst.\par \par \par 7-16-21:\par Bilateral breast MRI at [facility]: BI-RADS 4.\par 1.  2.3 x 2.5 x 2.5 cm area of diffuse edema and skin thickening of the left nipple/areola/adjacent skin, with irregular, peripherally enhancing mass with multiple internal septations.\par 2.  Left breast (3:00) 8 cm FN:\par 6 x 4 x 5 mm nodule: Targeted ultrasound recommended.\par \par 7-22-21:\par Diagnostic left breast ultrasound at [facility]:\par 1.  Above retroareolar mass confirmed.\par Core biopsy recommended.\par Benign results (above), concordant.\par 2.  NO discrete sonographic finding in the left breast (3:00).\par With the above benign pathology report, 6-month follow-up MRI has been recommended for January 2022........ \par \par \par No other procedures related to either breast.\par No other personal history of breast disease.\par \par No personal history of malignancy.\par \par Not Ashkenazi.\par \par Menarche at 9.\par Nulliparous.\par LMP: 2/3/2021.\par No exogenous hormones.\par \par \par Breast cancer risk score = 22.1.\par \par \par No relatives with breast cancer.\par No relatives with ovarian cancer.\par \par Not Ashkenazi.\par \par Family history of malignancy:\par A sister had Hodgkin's disease at 17.\par \par \par PMD: Dr. Jamee BRAN.\par \par April 2019 she had a cardiology evaluation (Dr. Jay LISKER).\par She was experiencing palpitations.\par No underlying cardiovascular disease identified..\par \par No pacemaker or defibrillator.\par No anticoagulants.\par \par + Asthma.\par No current medical therapy warranted.\par Pulmonary: Dr. Ricardo RANDALL.\par \par History of GERD, diagnosed by EGD (below).\par Currently not requiring medical therapy.\par \par \par Her only current medication is Effexor for perimenopausal symptoms.\par This was prescribed by her gynecologist.\par \par \par GYN: Dr. Roseanne STALLINGS.\par Seen in September 2021.\par She used to see Dr. Liana Danielle.\par \par \par April 2019: EGD (Dr. Jett CAZARES).\par Reflux esophagitis.\par \par She has not yet had her baseline colonoscopy
No

## 2022-12-14 ENCOUNTER — APPOINTMENT (OUTPATIENT)
Dept: GASTROENTEROLOGY | Facility: CLINIC | Age: 47
End: 2022-12-14

## 2022-12-14 VITALS
WEIGHT: 154 LBS | DIASTOLIC BLOOD PRESSURE: 78 MMHG | HEIGHT: 62 IN | HEART RATE: 95 BPM | SYSTOLIC BLOOD PRESSURE: 116 MMHG | OXYGEN SATURATION: 98 % | BODY MASS INDEX: 28.34 KG/M2

## 2022-12-14 DIAGNOSIS — K59.09 OTHER CONSTIPATION: ICD-10-CM

## 2022-12-14 PROCEDURE — 99214 OFFICE O/P EST MOD 30 MIN: CPT

## 2022-12-14 NOTE — ASSESSMENT
[FreeTextEntry1] : 1. constipation\par \par plan linzess daily\par \par 2. refractory GERD on ppi and  h2 blocker qhs, noncompliant with diet, positive smoking\par \par plan antireflux diet\par  smoking cessation\par  referral for BRAVO capsule and manometry with

## 2022-12-14 NOTE — REVIEW OF SYSTEMS
[Fever] : no fever [Chills] : no chills [Red Eyes] : eyes not red [Sore Throat] : no sore throat [As Noted in HPI] : as noted in HPI [Testicular Pain] : no testicular pain [Joint Stiffness] : no joint stiffness [Dizziness] : no dizziness [Anxiety] : no anxiety [Muscle Weakness] : no muscle weakness [Easy Bruising] : no tendency for easy bruising

## 2022-12-14 NOTE — HISTORY OF PRESENT ILLNESS
[FreeTextEntry1] : 48 yo female with c/o belching, and flatus, patient  denies gerd, n/v, patient reports certain foods and anxiety makes symptoms worse. constipation worsened. no brbpr, no melena. no weight loss.\par \par \par \par s/p egd reveals distal erosive esoghagitis, gastritsi,biopsied\par s/p colonoscopy reveals IH.

## 2022-12-20 ENCOUNTER — APPOINTMENT (OUTPATIENT)
Dept: ORTHOPEDIC SURGERY | Facility: CLINIC | Age: 47
End: 2022-12-20

## 2022-12-20 VITALS
WEIGHT: 150 LBS | DIASTOLIC BLOOD PRESSURE: 74 MMHG | SYSTOLIC BLOOD PRESSURE: 128 MMHG | BODY MASS INDEX: 27.6 KG/M2 | HEIGHT: 62 IN

## 2022-12-20 PROCEDURE — 72100 X-RAY EXAM L-S SPINE 2/3 VWS: CPT

## 2022-12-20 PROCEDURE — 99204 OFFICE O/P NEW MOD 45 MIN: CPT

## 2022-12-23 ENCOUNTER — NON-APPOINTMENT (OUTPATIENT)
Age: 47
End: 2022-12-23

## 2022-12-28 ENCOUNTER — APPOINTMENT (OUTPATIENT)
Dept: ORTHOPEDIC SURGERY | Facility: CLINIC | Age: 47
End: 2022-12-28

## 2022-12-28 VITALS
HEIGHT: 60 IN | DIASTOLIC BLOOD PRESSURE: 58 MMHG | TEMPERATURE: 97.6 F | WEIGHT: 150 LBS | HEART RATE: 135 BPM | BODY MASS INDEX: 29.45 KG/M2 | OXYGEN SATURATION: 97 % | SYSTOLIC BLOOD PRESSURE: 111 MMHG

## 2022-12-28 PROCEDURE — 99214 OFFICE O/P EST MOD 30 MIN: CPT

## 2022-12-28 NOTE — HISTORY OF PRESENT ILLNESS
[de-identified] : Ms. MADAY GARLAND  is a 47 year old female who presents with back and left lateral/anterior thigh pain since 12/15/22 when she bent over to put her pants on.  Her symmptoms have gotten worse.  She has taken steroids without any relief.   She can not sit for more than 5-10 seconds.  Normal bowel and bladder control.   Denies any recent fevers, chills, sweats, weight loss, or infection.\par \par The patients past medical history, past surgical history, medications, allergies, and social history were reviewed by me today with the patient and documented accordingly.  In addition, the patient's family history, which is noncontributory to their visit, was also reviewed.\par

## 2022-12-28 NOTE — PHYSICAL EXAM
[Antalgic] : antalgic [de-identified] : Examination of the lumbar spine reveals no midline tenderness palpation, step-offs, or skin lesions. Decreased range of motion with respect to flexion, extension, lateral bending, and rotation. No tenderness to palpation of the sciatic notch. No tenderness palpation of the bilateral greater trochanters. No pain with passive internal/external rotation of the hips. No instability of bilateral lower extremities.  Negative NICKY. Negative straight leg raise bilaterally. No bowstring.  Positive femoral stretch. 5 out of 5 iliopsoas, hip abductors, hips adductors, quadriceps, hamstrings, gastrocsoleus, tibialis anterior, extensor hallucis longus, peroneals. Grossly intact sensation to light touch bilateral lower extremities. 1+ patellar and Achilles reflexes. Downgoing Babinski. No clonus. Intact proprioception. Palpable pulses. No skin lesion and no edema on the right and left lower extremities. [de-identified] : Previous lumbar x-rays reveals mild degenerative changes without gross instability or aggressive lesions

## 2022-12-28 NOTE — DISCUSSION/SUMMARY
[de-identified] : We discussed further treatment options.  The patient has worsening radiculopathy.  It appears she has more of an upper lumbar radiculopathy.  She is on her second dose of oral steroids and is completing this.  She is also be given anti-inflammatory to begin as well as opiate pain medications by her primary care physician.  I recommended a lumbar spine MRI.  We briefly discussed options pending her MRI results including physical therapy, injections, and surgery.  This point she wished to avoid any type of surgical intervention.  We will obtain an MRI.  She will follow-up after.

## 2023-01-09 ENCOUNTER — APPOINTMENT (OUTPATIENT)
Dept: ORTHOPEDIC SURGERY | Facility: CLINIC | Age: 48
End: 2023-01-09
Payer: COMMERCIAL

## 2023-01-09 ENCOUNTER — APPOINTMENT (OUTPATIENT)
Dept: ULTRASOUND IMAGING | Facility: CLINIC | Age: 48
End: 2023-01-09

## 2023-01-09 ENCOUNTER — APPOINTMENT (OUTPATIENT)
Dept: MRI IMAGING | Facility: CLINIC | Age: 48
End: 2023-01-09

## 2023-01-09 VITALS
HEIGHT: 60 IN | SYSTOLIC BLOOD PRESSURE: 136 MMHG | DIASTOLIC BLOOD PRESSURE: 89 MMHG | BODY MASS INDEX: 29.45 KG/M2 | HEART RATE: 98 BPM | WEIGHT: 150 LBS | TEMPERATURE: 97.2 F

## 2023-01-09 PROCEDURE — 99214 OFFICE O/P EST MOD 30 MIN: CPT

## 2023-01-11 ENCOUNTER — NON-APPOINTMENT (OUTPATIENT)
Age: 48
End: 2023-01-11

## 2023-01-11 ENCOUNTER — APPOINTMENT (OUTPATIENT)
Dept: INTERNAL MEDICINE | Facility: CLINIC | Age: 48
End: 2023-01-11

## 2023-01-17 ENCOUNTER — OUTPATIENT (OUTPATIENT)
Dept: OUTPATIENT SERVICES | Facility: HOSPITAL | Age: 48
LOS: 1 days | End: 2023-01-17
Payer: COMMERCIAL

## 2023-01-17 ENCOUNTER — APPOINTMENT (OUTPATIENT)
Dept: MRI IMAGING | Facility: CLINIC | Age: 48
End: 2023-01-17
Payer: COMMERCIAL

## 2023-01-17 DIAGNOSIS — Z00.8 ENCOUNTER FOR OTHER GENERAL EXAMINATION: ICD-10-CM

## 2023-01-17 DIAGNOSIS — Z98.890 OTHER SPECIFIED POSTPROCEDURAL STATES: Chronic | ICD-10-CM

## 2023-01-17 DIAGNOSIS — M54.16 RADICULOPATHY, LUMBAR REGION: ICD-10-CM

## 2023-01-17 PROCEDURE — 72148 MRI LUMBAR SPINE W/O DYE: CPT

## 2023-01-17 PROCEDURE — 72148 MRI LUMBAR SPINE W/O DYE: CPT | Mod: 26

## 2023-01-20 ENCOUNTER — APPOINTMENT (OUTPATIENT)
Dept: ORTHOPEDIC SURGERY | Facility: CLINIC | Age: 48
End: 2023-01-20
Payer: COMMERCIAL

## 2023-01-20 VITALS — HEIGHT: 60 IN | WEIGHT: 155 LBS | BODY MASS INDEX: 30.43 KG/M2

## 2023-01-20 PROCEDURE — 99214 OFFICE O/P EST MOD 30 MIN: CPT

## 2023-01-27 ENCOUNTER — NON-APPOINTMENT (OUTPATIENT)
Age: 48
End: 2023-01-27

## 2023-02-27 ENCOUNTER — APPOINTMENT (OUTPATIENT)
Dept: ORTHOPEDIC SURGERY | Facility: CLINIC | Age: 48
End: 2023-02-27
Payer: COMMERCIAL

## 2023-02-27 VITALS
SYSTOLIC BLOOD PRESSURE: 130 MMHG | BODY MASS INDEX: 30.43 KG/M2 | HEIGHT: 60 IN | DIASTOLIC BLOOD PRESSURE: 84 MMHG | WEIGHT: 155 LBS

## 2023-02-27 PROCEDURE — 72110 X-RAY EXAM L-2 SPINE 4/>VWS: CPT

## 2023-02-27 PROCEDURE — 99215 OFFICE O/P EST HI 40 MIN: CPT

## 2023-02-27 NOTE — PHYSICAL EXAM
[de-identified] : Lumbar Physical Exam\par \par Antalgic gait, pain with heel walk, pain with toe walk\par \par Reflexes\par Patellar - normal\par Gastroc - normal\par Clonus - No\par \par Hip Exam - Normal\par \par Straight leg raise - none\par \par Pulses - 2+ dp/pt\par \par Range of motion - normal\par \par Sensation \par Sensation intact to light touch in L1, L2, L3, L4, L5 and S1 dermatomes bilaterally\par \par Motor\par 	IP	Quad	HS	TA	Gastroc	EHL\par Right	5/5	5/5	5/5	3+/5	5/5	5/5\par Left	5/5	5/5	5/5	4/5	5/5	5/5 [de-identified] : Lumbar radiographs\par No obvious instability noted\par Facet arthropathy\par \par Lumbar MRI\par L3-L4 and L4-L5 with severe spinal stenosis\par

## 2023-02-27 NOTE — ASSESSMENT
[FreeTextEntry1] : I had a long discussion with the patient in regards to her treatment plan and diagnosis.  She has some fairly substantial lumbar radiculopathy type symptoms.  She also has some severe stenosis on her lumbar spine MRI.  Her MRI findings do match her clinical exam.  I think we have a ways to go in terms of conservative care that can be offered.  We will start with a new round of prednisone taper.  I will also like her to discontinue tizanidine and Tylenol.  We will have her referred to [facility].  I think she may be a candidate for further conservative management from their perspective.  I would like to keep a close clinical eye on the patient.  I will have her follow-up in 2 weeks time.  I encouraged her to reach out to me at any point if her symptoms worsen or change in any way.

## 2023-02-27 NOTE — HISTORY OF PRESENT ILLNESS
[de-identified] : 47 year old female who presents for initial evaluation of her lower back pain.  The patient states since mid December she has had some fairly disabling bilateral lower extremity pain.  She has left-sided anterior lateral thigh, calf pain.  She also complains of severe right lower extremity pain, calf pain and posterior thigh pain.  She also feels like her right toes are curling.  She denies any bowel bladder issues.  She denies any saddle anesthesia.  She has had 2 rounds of oral steroids.  Unfortunately her symptoms have persisted.  She states that when the pain is bad she cannot walk.

## 2023-02-27 NOTE — ADDENDUM
[FreeTextEntry1] : I, Anamaria Andino, acted solely as a scribe for Dr. Jay Sutherland MD on this date 02/27/2023  \par \par All medical record entries made by the Scribe were at my, Dr. Jay Sutherland MD., direction and personally dictated by me on 02/27/2023 . I have reviewed the chart and agree that the record accurately reflects my personal performance of the history, physical exam, assessment and plan. I have also personally directed, reviewed, and agreed with the chart.

## 2023-03-02 ENCOUNTER — APPOINTMENT (OUTPATIENT)
Dept: ORTHOPEDIC SURGERY | Facility: CLINIC | Age: 48
End: 2023-03-02

## 2023-03-13 ENCOUNTER — APPOINTMENT (OUTPATIENT)
Dept: ORTHOPEDIC SURGERY | Facility: CLINIC | Age: 48
End: 2023-03-13
Payer: COMMERCIAL

## 2023-03-13 VITALS
SYSTOLIC BLOOD PRESSURE: 135 MMHG | DIASTOLIC BLOOD PRESSURE: 83 MMHG | WEIGHT: 155 LBS | BODY MASS INDEX: 30.43 KG/M2 | HEIGHT: 60 IN

## 2023-03-13 PROCEDURE — 99214 OFFICE O/P EST MOD 30 MIN: CPT

## 2023-03-13 NOTE — PHYSICAL EXAM
[de-identified] : Lumbar Physical Exam\par \par Antalgic gait, pain with heel walk, pain with toe walk\par \par Reflexes\par Patellar - normal\par Gastroc - normal\par Clonus - No\par \par Hip Exam - Normal\par \par Straight leg raise - none\par \par Pulses - 2+ dp/pt\par \par Range of motion - normal\par \par Sensation \par Sensation intact to light touch in L1, L2, L3, L4, L5 and S1 dermatomes bilaterally\par \par Motor\par 	IP	Quad	HS	TA	Gastroc	EHL\par Right	5/5	5/5	5/5	3+/5	5/5	5/5\par Left	5/5	5/5	5/5	4/5	5/5	5/5 [de-identified] : Lumbar radiographs\par No obvious instability noted\par Facet arthropathy\par \par Lumbar MRI\par L3-L4 and L4-L5 with severe spinal stenosis\par

## 2023-03-13 NOTE — HISTORY OF PRESENT ILLNESS
[de-identified] : 47 year old female who presents for follow-up evaluation of her lower back pain and radicular sxs down her LT lower extremity. Patient reports overall she is doing better compared to her last visit. She reports the prednisone significantly helped her sxs and that now she finds relief with movement as opposed to sitting. Patient reports is attending physical therapy that has helped alleviate her sxs significantly. Patient reports she went to see  who instructed her to hold off on the epidural injection and to continue with physical activity. \par \par 02/27/2023\par 47 year old female who presents for initial evaluation of her lower back pain.  The patient states since mid December she has had some fairly disabling bilateral lower extremity pain.  She has left-sided anterior lateral thigh, calf pain.  She also complains of severe right lower extremity pain, calf pain and posterior thigh pain.  She also feels like her right toes are curling.  She denies any bowel bladder issues.  She denies any saddle anesthesia.  She has had 2 rounds of oral steroids.  Unfortunately her symptoms have persisted.  She states that when the pain is bad she cannot walk.

## 2023-03-13 NOTE — ASSESSMENT
[FreeTextEntry1] : I had a long discussion with the patient in regards to her treatment plan and diagnosis.  She has some fairly substantial lumbar radiculopathy type symptoms.  She also has some severe stenosis on her lumbar spine MRI.  Her MRI findings do match her clinical exam. Today, I am pleased with her progress and her overall activity levels. I agree with Dr. Mabry that we should continue to further conservative management. She will continue physical therapy and management at [facility]. I would like to keep a close clinical eye on the patient.  I will have her follow-up in 4 to 6 weeks time.  I encouraged her to reach out to me at any point if her symptoms worsen or change in any way.

## 2023-03-13 NOTE — ADDENDUM
[FreeTextEntry1] : I, Anamaria Andino, acted solely as a scribe for Dr. Jay Sutherland MD on this date 03/13/2023  \par \par All medical record entries made by the Scribe were at my, Dr. Jay Sutherland MD., direction and personally dictated by me on 03/13/2023 . I have reviewed the chart and agree that the record accurately reflects my personal performance of the history, physical exam, assessment and plan. I have also personally directed, reviewed, and agreed with the chart.

## 2023-03-27 ENCOUNTER — APPOINTMENT (OUTPATIENT)
Dept: SURGICAL ONCOLOGY | Facility: CLINIC | Age: 48
End: 2023-03-27
Payer: COMMERCIAL

## 2023-04-03 ENCOUNTER — APPOINTMENT (OUTPATIENT)
Dept: SURGICAL ONCOLOGY | Facility: CLINIC | Age: 48
End: 2023-04-03
Payer: COMMERCIAL

## 2023-04-03 NOTE — ASSESSMENT
[FreeTextEntry1] : 3/27/2023: She did not keep her appointment for follow-up interval breast exam, breast cancer risk score = 22.1\par \par \par August 2021:\par Bilateral mammogram and sonogram at [facility]: BI-RADS 3.\par \par 2/14/2022:\par Left breast sonogram at [facility]: BI-RADS 2.\par Interval resolution of previous hypoechoic area in the left retroareolar region.\par \par Annual breast imaging is due August 2022.\par Prescription provided at her March 2022 visit............\par \par \par \par 1/31/2022:\par Bilateral breast MRI at [facility]: BI-RADS 3.\par Significant improvement in skin thickening and edema of the left nipple and areolar region and adjacent skin.\par Also, peripherally enhancing masslike finding in the left retroareolar region has improved.\par 6-month follow-up MRI recommended for July 2022.\par I have provided her with a prescription at her March 2022 visit.............\par \par

## 2023-04-03 NOTE — REVIEW OF SYSTEMS
[Negative] : Endocrine [FreeTextEntry6] : Asthma [FreeTextEntry8] : Reflux [FreeTextEntry1] : Increased risk of breast cancer

## 2023-04-03 NOTE — HISTORY OF PRESENT ILLNESS
[de-identified] : 47 year-old lady.\par \par Follow-up breast exam.\par \par Today she is asymptomatic.\par \par \par 10/26/2021:\par Incisional biopsy of the left nipple and areolar complex for chronic, intermittently infected and inflamed, process.\par Plastics: Dr. Erwin Pena.\par Surgical pathology:\par Unremarkable skin and fatty breast tissue.\par \par We spoke 11/10/2021.\par She was having some discomfort at the surgical site.\par Her first postoperative visit was 11/15/2021:\par My PE:\par + Induration at left breast biopsy site, with no worrisome findings.\par \par 12/13/2021:\par Scheduled follow-up visit\par She felt well.\par No constitutional or specific signs or symptoms.\par My PE: Minimal residual induration, no other inflammatory or infectious changes.\par \par \par 1/20/2022 office visit:\par Occasional discomfort at the surgical site in the left breast.\par No other signs or symptoms related to either breast.\par \par \par CC:\par \par \par Initially referred August 2021 with left nipple inversion since April 2021 (below).\par \par April 2021: Diagnostic left mammogram in  at [facility]: BI-RADS 3.\par \par Breast MRI was ordered by her internist\par July 2021 breast MRI at [facility]: BI-RADS 4.\par A peripheral left breast nodule was noted for which a targeted ultrasound was recommended.\par She initially saw Dr. Susan PALLESCHI.\par Left breast sonogram guided core biopsy at [facility]: benign and concordant: \par Chronic and focally acute inflammation associated with macrophages and rare multinucleated giant cells.\par \par \par August 2021:\par My PE confirmed the left nipple inversion.\par No other visible or palpable abnormalities noted.\par \par \par August 2021:\par Bilateral mammogram and sonogram at [facility]: BI-RADS 3.\par 1.  January 2022 breast MRI recommended, 6-month follow-up to benign core biopsy.\par 2.  February 2022 left breast sonogram recommended for short-term reevaluation of previously noted finding at 3:00..\par \par \par September 9, 2021, the left nipple and areola became thickened.\par + Associated redness and tenderness.\par Painful and tender.\par No fever.\par Had some sweats, but not sure if related to possible infection, or due to perimenopause.\par \par September 13, 2021, she saw her internist.\par She was started on cefadroxil with improvement in her signs and symptoms, but not resolution.\par \par In retrospect, even prior to the above events (e.g. Labor Day 2021), the left nipple and areola although improved, and had never returned to normal.\par \par September 2021:\par My physical examination:\par Induration of the outer left nipple and areolar complex with associated erythema.\par Based on pictures she had from 2 weeks prior, her exam was significantly improved.\par \par \par August 2021: Referred by her internist (Dr. Jamee BRAN) with LEFT NIPPLE inversion.\par \par This was a new finding in mid April, 2021.\par \par + Associated erythema, induration, and tenderness.\par \par No precipitating cause that she is aware of.\par No other signs or symptoms related to either breast.\par \par 4/28/2021:\par Diagnostic left mammogram and sonogram at [facility]: BI-RADS 3.\par Bilateral mammogram and ultrasound recommended for July 2021.\par \par She was treated with a 10-day course of oral antibiotics with some improvement, but without resolution.\par \par The above infectious/inflammatory changes recurred at the end of May 2021\par \par \par 7/22/2021:\par Left breast (retroareolar) core biopsy at [facility]:\par Benign and concordant.\par Chronic and focally acute inflammation associated with macrophages and rare multinucleated giant cells.\par Differential diagnosis includes healing abscess, and ruptured inflamed cyst.\par \par \par 7-16-21:\par Bilateral breast MRI at [facility]: BI-RADS 4.\par 1.  2.3 x 2.5 x 2.5 cm area of diffuse edema and skin thickening of the left nipple/areola/adjacent skin, with irregular, peripherally enhancing mass with multiple internal septations.\par 2.  Left breast (3:00) 8 cm FN:\par 6 x 4 x 5 mm nodule: Targeted ultrasound recommended.\par \par 7-22-21:\par Diagnostic left breast ultrasound at [facility]:\par 1.  Above retroareolar mass confirmed.\par Core biopsy recommended.\par Benign results (above), concordant.\par 2.  NO discrete sonographic finding in the left breast (3:00).\par With the above benign pathology report, 6-month follow-up MRI has been recommended for January 2022........ \par \par \par No other procedures related to either breast.\par No other personal history of breast disease.\par \par No personal history of malignancy.\par \par Not Ashkenazi.\par \par Menarche at 9.\par Nulliparous.\par LMP: 2/3/2021.\par No exogenous hormones.\par \par \par Breast cancer risk score = 22.1.\par \par \par No relatives with breast cancer.\par No relatives with ovarian cancer.\par \par Not Ashkenazi.\par \par Family history of malignancy:\par A sister had Hodgkin's disease at 17.\par \par \par PMD: Dr. Jamee BRAN.\par \par April 2019 she had a cardiology evaluation (Dr. Jay LISKER).\par She was experiencing palpitations.\par No underlying cardiovascular disease identified..\par \par No pacemaker or defibrillator.\par No anticoagulants.\par \par + Asthma.\par No current medical therapy warranted.\par Pulmonary: Dr. Ricardo RANDALL.\par \par History of GERD, diagnosed by EGD (below).\par Currently not requiring medical therapy.\par \par + Lower back pain with left lower extremity radicular symptoms.\par Orthopedics: Dr. Jay DOWNING.\par Has had physical therapy.\par Symptoms being treated with tizanidine\par \par + Effexor for perimenopausal symptoms.\par This was prescribed by her gynecologist.\par \par \par GYN: Dr. Roseanne STALLINGS.\par Seen in September 2021.\par She used to see Dr. Liana Danielle.\par \par \par April 2019: EGD (Dr. Jett CAZARES).\par Reflux esophagitis.\par \par October 2022:\par Colonoscopy okay x10 years.\par Dr. Simone ROSE

## 2023-04-03 NOTE — REASON FOR VISIT
[Other: _____] : [unfilled] [FreeTextEntry2] : 3/27/2023: She did not keep her appointment for follow-up interval breast exam, breast cancer risk score = 22.1

## 2023-04-03 NOTE — PHYSICAL EXAM
[Normal] : supple, no neck mass and thyroid not enlarged [Normal Neck Lymph Nodes] : normal neck lymph nodes  [Normal Supraclavicular Lymph Nodes] : normal supraclavicular lymph nodes [Normal Axillary Lymph Nodes] : normal axillary lymph nodes [Normal] : normal appearance, no rash, nodules, vesicles, ulcers, erythema [de-identified] : Groins not examined [de-identified] : Below

## 2023-04-05 ENCOUNTER — APPOINTMENT (OUTPATIENT)
Dept: INTERNAL MEDICINE | Facility: CLINIC | Age: 48
End: 2023-04-05
Payer: COMMERCIAL

## 2023-04-05 ENCOUNTER — NON-APPOINTMENT (OUTPATIENT)
Age: 48
End: 2023-04-05

## 2023-04-05 ENCOUNTER — LABORATORY RESULT (OUTPATIENT)
Age: 48
End: 2023-04-05

## 2023-04-05 VITALS
OXYGEN SATURATION: 97 % | SYSTOLIC BLOOD PRESSURE: 140 MMHG | WEIGHT: 148 LBS | DIASTOLIC BLOOD PRESSURE: 80 MMHG | BODY MASS INDEX: 29.06 KG/M2 | HEIGHT: 60 IN | HEART RATE: 81 BPM

## 2023-04-05 DIAGNOSIS — E53.8 DEFICIENCY OF OTHER SPECIFIED B GROUP VITAMINS: ICD-10-CM

## 2023-04-05 DIAGNOSIS — Z00.00 ENCOUNTER FOR GENERAL ADULT MEDICAL EXAMINATION W/OUT ABNORMAL FINDINGS: ICD-10-CM

## 2023-04-05 PROCEDURE — 99396 PREV VISIT EST AGE 40-64: CPT | Mod: 25

## 2023-04-05 PROCEDURE — 93000 ELECTROCARDIOGRAM COMPLETE: CPT

## 2023-04-05 PROCEDURE — 36415 COLL VENOUS BLD VENIPUNCTURE: CPT

## 2023-04-05 RX ORDER — TIZANIDINE 2 MG/1
2 TABLET ORAL
Qty: 90 | Refills: 0 | Status: COMPLETED | COMMUNITY
Start: 2022-12-28 | End: 2023-04-05

## 2023-04-05 RX ORDER — FAMOTIDINE 40 MG/1
40 TABLET, FILM COATED ORAL TWICE DAILY
Qty: 60 | Refills: 3 | Status: COMPLETED | COMMUNITY
Start: 2022-09-29 | End: 2023-04-05

## 2023-04-05 RX ORDER — TIZANIDINE 4 MG/1
4 TABLET ORAL
Qty: 90 | Refills: 0 | Status: COMPLETED | COMMUNITY
Start: 2023-01-20 | End: 2023-04-05

## 2023-04-05 RX ORDER — OXYCODONE AND ACETAMINOPHEN 5; 325 MG/1; MG/1
5-325 TABLET ORAL
Qty: 18 | Refills: 0 | Status: COMPLETED | COMMUNITY
Start: 2022-12-21 | End: 2023-04-05

## 2023-04-05 RX ORDER — LINACLOTIDE 145 UG/1
145 CAPSULE, GELATIN COATED ORAL
Qty: 30 | Refills: 3 | Status: COMPLETED | COMMUNITY
Start: 2022-12-14 | End: 2023-04-05

## 2023-04-05 RX ORDER — DICLOFENAC SODIUM 75 MG/1
75 TABLET, DELAYED RELEASE ORAL
Qty: 60 | Refills: 3 | Status: COMPLETED | COMMUNITY
Start: 2023-01-20 | End: 2023-04-05

## 2023-04-05 RX ORDER — NAPROXEN 500 MG/1
500 TABLET ORAL
Qty: 2 | Refills: 1 | Status: COMPLETED | COMMUNITY
Start: 2023-01-09 | End: 2023-04-05

## 2023-04-05 RX ORDER — METHYLPREDNISOLONE 4 MG/1
4 TABLET ORAL
Qty: 1 | Refills: 1 | Status: COMPLETED | COMMUNITY
Start: 2022-12-20 | End: 2023-04-05

## 2023-04-05 RX ORDER — PREDNISONE 10 MG/1
10 TABLET ORAL
Qty: 39 | Refills: 0 | Status: COMPLETED | COMMUNITY
Start: 2023-02-27 | End: 2023-04-05

## 2023-04-05 RX ORDER — DICLOFENAC SODIUM 75 MG/1
75 TABLET, DELAYED RELEASE ORAL
Qty: 1 | Refills: 0 | Status: COMPLETED | COMMUNITY
Start: 2022-12-27 | End: 2023-04-05

## 2023-04-05 RX ORDER — CYCLOBENZAPRINE HYDROCHLORIDE 10 MG/1
10 TABLET, FILM COATED ORAL
Qty: 30 | Refills: 0 | Status: COMPLETED | COMMUNITY
Start: 2022-12-19 | End: 2023-04-05

## 2023-04-06 LAB
25(OH)D3 SERPL-MCNC: 17.4 NG/ML
ALBUMIN SERPL ELPH-MCNC: 4.3 G/DL
ALP BLD-CCNC: 77 U/L
ALT SERPL-CCNC: 18 U/L
ANION GAP SERPL CALC-SCNC: 11 MMOL/L
APPEARANCE: CLEAR
AST SERPL-CCNC: 14 U/L
BASOPHILS # BLD AUTO: 0.05 K/UL
BASOPHILS NFR BLD AUTO: 0.6 %
BILIRUB SERPL-MCNC: 0.3 MG/DL
BILIRUBIN URINE: NEGATIVE
BLOOD URINE: NEGATIVE
BUN SERPL-MCNC: 9 MG/DL
CALCIUM SERPL-MCNC: 9.8 MG/DL
CHLORIDE SERPL-SCNC: 103 MMOL/L
CHOLEST SERPL-MCNC: 159 MG/DL
CO2 SERPL-SCNC: 24 MMOL/L
COLOR: YELLOW
CREAT SERPL-MCNC: 0.63 MG/DL
EGFR: 109 ML/MIN/1.73M2
EOSINOPHIL # BLD AUTO: 0.19 K/UL
EOSINOPHIL NFR BLD AUTO: 2.2 %
ERYTHROCYTE [SEDIMENTATION RATE] IN BLOOD BY WESTERGREN METHOD: 8 MM/HR
GLUCOSE QUALITATIVE U: NEGATIVE MG/DL
GLUCOSE SERPL-MCNC: 84 MG/DL
HCT VFR BLD CALC: 44.3 %
HDLC SERPL-MCNC: 50 MG/DL
HGB BLD-MCNC: 14 G/DL
IMM GRANULOCYTES NFR BLD AUTO: 0.2 %
KETONES URINE: NEGATIVE MG/DL
LDLC SERPL CALC-MCNC: 99 MG/DL
LEUKOCYTE ESTERASE URINE: NEGATIVE
LYMPHOCYTES # BLD AUTO: 2.27 K/UL
LYMPHOCYTES NFR BLD AUTO: 26.4 %
MAN DIFF?: NORMAL
MCHC RBC-ENTMCNC: 31.2 PG
MCHC RBC-ENTMCNC: 31.6 GM/DL
MCV RBC AUTO: 98.7 FL
MONOCYTES # BLD AUTO: 0.74 K/UL
MONOCYTES NFR BLD AUTO: 8.6 %
NEUTROPHILS # BLD AUTO: 5.34 K/UL
NEUTROPHILS NFR BLD AUTO: 62 %
NITRITE URINE: NEGATIVE
NONHDLC SERPL-MCNC: 109 MG/DL
PH URINE: 8
PLATELET # BLD AUTO: 384 K/UL
POTASSIUM SERPL-SCNC: 4 MMOL/L
PROT SERPL-MCNC: 6.9 G/DL
PROTEIN URINE: NEGATIVE MG/DL
RBC # BLD: 4.49 M/UL
RBC # FLD: 12.2 %
SODIUM SERPL-SCNC: 138 MMOL/L
SPECIFIC GRAVITY URINE: 1.01
T3 SERPL-MCNC: 106 NG/DL
T3FREE SERPL-MCNC: 2.95 PG/ML
T3RU NFR SERPL: 1.1 TBI
T4 FREE SERPL-MCNC: 1 NG/DL
T4 SERPL-MCNC: 5.4 UG/DL
TRIGL SERPL-MCNC: 54 MG/DL
TSH SERPL-ACNC: 0.98 UIU/ML
UROBILINOGEN URINE: 0.2 MG/DL
VIT B12 SERPL-MCNC: 341 PG/ML
WBC # FLD AUTO: 8.61 K/UL

## 2023-04-11 NOTE — PLAN
[FreeTextEntry1] : Blood tests were ordered and venipuncture was performed today in the office.\par \par Anxiety/Depression is improved-pt would like to decrease /wean off the Effexor XR at this time.\par Discussed with patient how to decrease Effexor XR dosage from 150mg to 75mg qd

## 2023-04-11 NOTE — DATA REVIEWED
[FreeTextEntry1] : EKG today was within normal limits\par  NSR at 83 BPM- no change from baseline EKG from 04/04/2022

## 2023-04-11 NOTE — HISTORY OF PRESENT ILLNESS
[FreeTextEntry1] : Pt. presents for a comprehensive evaluation for multiple medical issues.\par  [de-identified] : Patient has been having difficulty over the past 3months because of severe LBP with persistent left sciatica symptoms.\par She saw orthopedic spine surgeon Dr. Steward and now seeing  Dr. Sutherland and also saw a physiatrist Dr. Zollinger.  She is on NSAIDS, and muscle relaxants and may try gabapentin but doesn't want to take too many medications.\par she lost 12 lbs with healthier diet and started pilates/yoga and more biking and walking.\par The low back pain is persistent and worse with prolonged sitting so it does affect her ability to focus at work.\par \par She recently purchased her coop and will be closing on her apartment this month.\par \par However, she reports chronic anxiety with stress of caring for her mother who suffered multiple medical issues her mother required 2 surgeries in the past 2 years..  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

## 2023-04-11 NOTE — REVIEW OF SYSTEMS
[Hot Flashes] : hot flashes [Insomnia] : insomnia [Anxiety] : anxiety [Depression] : depression [Negative] : Heme/Lymph [Recent Change In Weight] : ~T no recent weight change [FreeTextEntry2] : weight loss

## 2023-04-11 NOTE — PHYSICAL EXAM
[No Acute Distress] : no acute distress [Well Nourished] : well nourished [Well Developed] : well developed [Well-Appearing] : well-appearing [Normal Sclera/Conjunctiva] : normal sclera/conjunctiva [PERRL] : pupils equal round and reactive to light [EOMI] : extraocular movements intact [Normal Outer Ear/Nose] : the outer ears and nose were normal in appearance [No JVD] : no jugular venous distention [No Lymphadenopathy] : no lymphadenopathy [Supple] : supple [Thyroid Normal, No Nodules] : the thyroid was normal and there were no nodules present [No Respiratory Distress] : no respiratory distress  [No Accessory Muscle Use] : no accessory muscle use [Clear to Auscultation] : lungs were clear to auscultation bilaterally [Normal Rate] : normal rate  [Regular Rhythm] : with a regular rhythm [Normal S1, S2] : normal S1 and S2 [No Murmur] : no murmur heard [No Carotid Bruits] : no carotid bruits [No Abdominal Bruit] : a ~M bruit was not heard ~T in the abdomen [No Varicosities] : no varicosities [Pedal Pulses Present] : the pedal pulses are present [No Edema] : there was no peripheral edema [No Palpable Aorta] : no palpable aorta [No Extremity Clubbing/Cyanosis] : no extremity clubbing/cyanosis [Normal Appearance] : normal in appearance [No Axillary Lymphadenopathy] : no axillary lymphadenopathy [Soft] : abdomen soft [Non Tender] : non-tender [Non-distended] : non-distended [No Masses] : no abdominal mass palpated [No HSM] : no HSM [Normal Bowel Sounds] : normal bowel sounds [No CVA Tenderness] : no CVA  tenderness [No Spinal Tenderness] : no spinal tenderness [No Joint Swelling] : no joint swelling [Grossly Normal Strength/Tone] : grossly normal strength/tone [No Rash] : no rash [Coordination Grossly Intact] : coordination grossly intact [No Focal Deficits] : no focal deficits [Normal Gait] : normal gait [Deep Tendon Reflexes (DTR)] : deep tendon reflexes were 2+ and symmetric [Normal Affect] : the affect was normal [Normal Insight/Judgement] : insight and judgment were intact [Normal] : affect was normal and insight and judgment were intact [de-identified] : s/p right breast surgical scar

## 2023-04-11 NOTE — HEALTH RISK ASSESSMENT
[Good] : ~his/her~  mood as  good [2 - 4 times a month (2 pts)] : 2-4 times a month (2 points) [Never (0 pts)] : Never (0 points) [No] : In the past 12 months have you used drugs other than those required for medical reasons? No [0] : 1) Little interest or pleasure doing things: Not at all (0) [Patient reported mammogram was normal] : Patient reported mammogram was normal [Patient reported PAP Smear was normal] : Patient reported PAP Smear was normal [HIV test declined] : HIV test declined [Hepatitis C test declined] : Hepatitis C test declined [1] : 2) Feeling down, depressed, or hopeless for several days (1) [Never] : Never [de-identified] : now sees surgical oncologist Dr. Maldonado q 6months [de-identified] : socially 2 times a month [Audit-CScore] : 2 [de-identified] : walks daily/ pilates/ yoga weekly and core exercises daily for her back [de-identified] : overall healthy balanced and varied diet without any exclusions  [[id number]] : 1 [MammogramDate] : 08/21 [MammogramComments] : -also recently had MRI and US of breasts per Dr. Maldonado [PapSmearDate] : 04/22 [PapSmearComments] : with Dr. Danielle- LMP was 2/03/2021 -cycles are irregular [ColonoscopyComments] : will schedule

## 2023-04-24 ENCOUNTER — APPOINTMENT (OUTPATIENT)
Dept: ORTHOPEDIC SURGERY | Facility: CLINIC | Age: 48
End: 2023-04-24
Payer: COMMERCIAL

## 2023-04-24 PROCEDURE — 99214 OFFICE O/P EST MOD 30 MIN: CPT

## 2023-04-24 NOTE — HISTORY OF PRESENT ILLNESS
[de-identified] : 47 year old female who presents for follow-up evaluation of lower back pain and radicular sxs down her LT lower extremity. She reports her sxs have persisted relative to her previous appt. She reports her LT leg sxs are slowly subsiding. Today, the patient reports recent onset of RT foot numbness that is affecting her balance, but reports she is managing it well. She reports she is still attending physical therapy and is still f/u with Dr. Mabry where she is still making a decision on whether or not she should receive an epidural. \par Denies any bowel/bladder incontinence. Denies any saddle anesthesia. \par \par 03/13/2023\par 47 year old female who presents for follow-up evaluation of her lower back pain and radicular sxs down her LT lower extremity. Patient reports overall she is doing better compared to her last visit. She reports the prednisone significantly helped her sxs and that now she finds relief with movement as opposed to sitting. Patient reports is attending physical therapy that has helped alleviate her sxs significantly. Patient reports she went to see  who instructed her to hold off on the epidural injection and to continue with physical activity. \par \par 02/27/2023\par 47 year old female who presents for initial evaluation of her lower back pain.  The patient states since mid December she has had some fairly disabling bilateral lower extremity pain.  She has left-sided anterior lateral thigh, calf pain.  She also complains of severe right lower extremity pain, calf pain and posterior thigh pain.  She also feels like her right toes are curling.  She denies any bowel bladder issues.  She denies any saddle anesthesia.  She has had 2 rounds of oral steroids.  Unfortunately her symptoms have persisted.  She states that when the pain is bad she cannot walk.

## 2023-04-24 NOTE — ASSESSMENT
[FreeTextEntry1] : I had a long discussion with the patient in regards to her treatment plan and diagnosis.  She has some fairly substantial lumbar radiculopathy type symptoms.  She also has some severe stenosis on her lumbar spine MRI.  Her MRI findings do match her clinical exam. Today, I am pleased with her progress and her overall activity levels. She will continue to f/u with Dr. Mabry and continue with conservative management. In tandem, she will continue with physical therapy and with her home exercises. The patient can continue with Tizanidine as medically indicated. I would like to keep a close clinical eye on the patient.  I will have her follow-up in 3 months time.  I encouraged her to reach out to me at any point if her symptoms worsen or change in any way.

## 2023-04-24 NOTE — ADDENDUM
[FreeTextEntry1] : I, Anamaria Andino, acted solely as a scribe for Dr. Jay Sutherland MD on this date 04/24/2023  \par \par All medical record entries made by the Scribe were at my, Dr. Jay Sutherland MD., direction and personally dictated by me on 04/24/2023 . I have reviewed the chart and agree that the record accurately reflects my personal performance of the history, physical exam, assessment and plan. I have also personally directed, reviewed, and agreed with the chart.

## 2023-04-24 NOTE — PHYSICAL EXAM
[de-identified] : Lumbar Physical Exam\par \par Antalgic gait, pain with heel walk, pain with toe walk\par \par Reflexes\par Patellar - normal\par Gastroc - normal\par Clonus - No\par \par Hip Exam - Normal\par \par Straight leg raise - none\par \par Pulses - 2+ dp/pt\par \par Range of motion - normal\par \par Sensation \par Sensation intact to light touch in L1, L2, L3, L4, L5 and S1 dermatomes bilaterally\par \par Motor\par 	IP	Quad	HS	TA	Gastroc	EHL\par Right	5/5	5/5	5/5	4+/5	5/5	5/5\par Left	5/5	5/5	5/5	4/5	5/5	5/5 [de-identified] : Lumbar radiographs\par No obvious instability noted\par Facet arthropathy\par \par Lumbar MRI\par L3-L4 and L4-L5 with severe spinal stenosis\par

## 2023-04-30 NOTE — REASON FOR VISIT
[Other: _____] : [unfilled] [FreeTextEntry2] : 04/03/2023: She did not keep her appointment for interval breast exam, breast cancer risk score = 22.1

## 2023-04-30 NOTE — PHYSICAL EXAM
[Normal] : supple, no neck mass and thyroid not enlarged [Normal Neck Lymph Nodes] : normal neck lymph nodes  [Normal Supraclavicular Lymph Nodes] : normal supraclavicular lymph nodes [Normal Axillary Lymph Nodes] : normal axillary lymph nodes [Normal] : normal appearance, no rash, nodules, vesicles, ulcers, erythema [de-identified] : Groins not examined [de-identified] : Below

## 2023-04-30 NOTE — HISTORY OF PRESENT ILLNESS
[de-identified] : 47 year-old lady.\par \par Follow-up breast exam.\par \par Today she is asymptomatic.\par \par \par 10/26/2021:\par Incisional biopsy of the left nipple and areolar complex for chronic, intermittently infected and inflamed, process.\par Plastics: Dr. Erwin Pena.\par Surgical pathology:\par Unremarkable skin and fatty breast tissue.\par \par We spoke 11/10/2021.\par She was having some discomfort at the surgical site.\par Her first postoperative visit was 11/15/2021:\par My PE:\par + Induration at left breast biopsy site, with no worrisome findings.\par \par 12/13/2021:\par Scheduled follow-up visit\par She felt well.\par No constitutional or specific signs or symptoms.\par My PE: Minimal residual induration, no other inflammatory or infectious changes.\par \par \par 1/20/2022 office visit:\par Occasional discomfort at the surgical site in the left breast.\par No other signs or symptoms related to either breast.\par \par \par CC:\par \par \par Initially referred August 2021 with left nipple inversion since April 2021 (below).\par \par April 2021: Diagnostic left mammogram in  at [facility]: BI-RADS 3.\par \par Breast MRI was ordered by her internist\par July 2021 breast MRI at [facility]: BI-RADS 4.\par A peripheral left breast nodule was noted for which a targeted ultrasound was recommended.\par She initially saw Dr. Susan PALLESCHI.\par Left breast sonogram guided core biopsy at [facility]: benign and concordant: \par Chronic and focally acute inflammation associated with macrophages and rare multinucleated giant cells.\par \par \par August 2021:\par My PE confirmed the left nipple inversion.\par No other visible or palpable abnormalities noted.\par \par \par August 2021:\par Bilateral mammogram and sonogram at [facility]: BI-RADS 3.\par 1.  January 2022 breast MRI recommended, 6-month follow-up to benign core biopsy.\par 2.  February 2022 left breast sonogram recommended for short-term reevaluation of previously noted finding at 3:00..\par \par \par September 9, 2021, the left nipple and areola became thickened.\par + Associated redness and tenderness.\par Painful and tender.\par No fever.\par Had some sweats, but not sure if related to possible infection, or due to perimenopause.\par \par September 13, 2021, she saw her internist.\par She was started on cefadroxil with improvement in her signs and symptoms, but not resolution.\par \par In retrospect, even prior to the above events (e.g. Labor Day 2021), the left nipple and areola although improved, and had never returned to normal.\par \par September 2021:\par My physical examination:\par Induration of the outer left nipple and areolar complex with associated erythema.\par Based on pictures she had from 2 weeks prior, her exam was significantly improved.\par \par \par August 2021: Referred by her internist (Dr. Jamee BRAN) with LEFT NIPPLE inversion.\par \par This was a new finding in mid April, 2021.\par \par + Associated erythema, induration, and tenderness.\par \par No precipitating cause that she is aware of.\par No other signs or symptoms related to either breast.\par \par 4/28/2021:\par Diagnostic left mammogram and sonogram at [facility]: BI-RADS 3.\par Bilateral mammogram and ultrasound recommended for July 2021.\par \par She was treated with a 10-day course of oral antibiotics with some improvement, but without resolution.\par \par The above infectious/inflammatory changes recurred at the end of May 2021\par \par \par 7/22/2021:\par Left breast (retroareolar) core biopsy at [facility]:\par Benign and concordant.\par Chronic and focally acute inflammation associated with macrophages and rare multinucleated giant cells.\par Differential diagnosis includes healing abscess, and ruptured inflamed cyst.\par \par \par 7-16-21:\par Bilateral breast MRI at [facility]: BI-RADS 4.\par 1.  2.3 x 2.5 x 2.5 cm area of diffuse edema and skin thickening of the left nipple/areola/adjacent skin, with irregular, peripherally enhancing mass with multiple internal septations.\par 2.  Left breast (3:00) 8 cm FN:\par 6 x 4 x 5 mm nodule: Targeted ultrasound recommended.\par \par 7-22-21:\par Diagnostic left breast ultrasound at [facility]:\par 1.  Above retroareolar mass confirmed.\par Core biopsy recommended.\par Benign results (above), concordant.\par 2.  NO discrete sonographic finding in the left breast (3:00).\par With the above benign pathology report, 6-month follow-up MRI has been recommended for January 2022........ \par \par \par No other procedures related to either breast.\par No other personal history of breast disease.\par \par No personal history of malignancy.\par \par Not Ashkenazi.\par \par Menarche at 9.\par Nulliparous.\par LMP: 2/3/2021.\par No exogenous hormones.\par \par \par Breast cancer risk score = 22.1.\par \par \par No relatives with breast cancer.\par No relatives with ovarian cancer.\par \par Not Ashkenazi.\par \par Family history of malignancy:\par A sister had Hodgkin's disease at 17.\par \par \par PMD: Dr. Jamee BRAN.\par \par April 2019 she had a cardiology evaluation (Dr. Jay LISKER).\par She was experiencing palpitations.\par No underlying cardiovascular disease identified..\par \par No pacemaker or defibrillator.\par No anticoagulants.\par \par + Asthma.\par No current medical therapy warranted.\par Pulmonary: Dr. Ricardo RANDALL.\par \par History of GERD, diagnosed by EGD (below).\par Currently not requiring medical therapy.\par \par + Lower back pain with left lower extremity radicular symptoms.\par Orthopedics: Dr. Jay DOWNING.\par Has had physical therapy.\par Symptoms being treated with tizanidine\par \par + Effexor for perimenopausal symptoms.\par This was prescribed by her gynecologist.\par \par \par GYN: Dr. Roseanne STALLINGS.\par Seen in September 2021.\par She used to see Dr. Liana Danielle.\par \par \par April 2019: EGD (Dr. Jett CAZARES).\par Reflux esophagitis.\par \par October 2022:\par Colonoscopy okay x10 years.\par Dr. Simone ROSE

## 2023-04-30 NOTE — ASSESSMENT
[FreeTextEntry1] : 04/03/2023: She did not keep her appointment for interval breast exam, breast cancer risk score = 22.1\par \par \par August 2021:\par Bilateral mammogram and sonogram at [facility]: BI-RADS 3.\par \par 2/14/2022:\par Left breast sonogram at [facility]: BI-RADS 2.\par Interval resolution of previous hypoechoic area in the left retroareolar region.\par \par Annual breast imaging is due August 2022.\par Prescription provided at her March 2022 visit............\par \par \par \par 1/31/2022:\par Bilateral breast MRI at [facility]: BI-RADS 3.\par Significant improvement in skin thickening and edema of the left nipple and areolar region and adjacent skin.\par Also, peripherally enhancing masslike finding in the left retroareolar region has improved.\par 6-month follow-up MRI recommended for July 2022.\par I have provided her with a prescription at her March 2022 visit.............\par \par

## 2023-04-30 NOTE — REVIEW OF SYSTEMS
[Negative] : Integumentary [FreeTextEntry6] : Asthma [FreeTextEntry8] : Reflux [de-identified] : Low back pain [de-identified] : Perimenopausal symptoms [FreeTextEntry1] : Increased risk of breast cancer

## 2023-05-01 ENCOUNTER — APPOINTMENT (OUTPATIENT)
Dept: SURGICAL ONCOLOGY | Facility: CLINIC | Age: 48
End: 2023-05-01
Payer: COMMERCIAL

## 2023-05-01 VITALS
HEIGHT: 60 IN | RESPIRATION RATE: 16 BRPM | DIASTOLIC BLOOD PRESSURE: 78 MMHG | OXYGEN SATURATION: 98 % | HEART RATE: 80 BPM | SYSTOLIC BLOOD PRESSURE: 126 MMHG | BODY MASS INDEX: 29.06 KG/M2 | WEIGHT: 148 LBS

## 2023-05-01 PROCEDURE — 99214 OFFICE O/P EST MOD 30 MIN: CPT

## 2023-05-01 NOTE — HISTORY OF PRESENT ILLNESS
[de-identified] : 47 year-old lady.\par \par Follow-up breast exam.\par \par Today she is asymptomatic.\par Since ~mid March 2023 she has had some left nipple retraction when in the shower, but it everts when she steps out.\par No other concerns related to either breast.\par \par \par 10/26/2021:\par Incisional biopsy of the left nipple and areolar complex for chronic, intermittently infected and inflamed, process.\par Plastics: Dr. Erwin Pena.\par Surgical pathology:\par Unremarkable skin and fatty breast tissue.\par \par We spoke 11/10/2021.\par She was having some discomfort at the surgical site.\par Her first postoperative visit was 11/15/2021:\par My PE:\par + Induration at left breast biopsy site, with no worrisome findings.\par \par 12/13/2021:\par Scheduled follow-up visit\par She felt well.\par No constitutional or specific signs or symptoms.\par My PE: Minimal residual induration, no other inflammatory or infectious changes.\par \par \par Initially referred August 2021 with left nipple inversion since April 2021 (below).\par \par April 2021: Diagnostic left mammogram in  at [facility]: BI-RADS 3.\par \par Breast MRI was ordered by her internist\par July 2021 breast MRI at [facility]: BI-RADS 4.\par A peripheral left breast nodule was noted for which a targeted ultrasound was recommended.\par She initially saw Dr. Susan PALLESCHI.\par Left breast sonogram guided core biopsy at [facility]: benign and concordant: \par Chronic and focally acute inflammation associated with macrophages and rare multinucleated giant cells.\par \par \par August 2021:\par My PE confirmed the left nipple inversion.\par No other visible or palpable abnormalities noted.\par \par \par August 2021:\par Bilateral mammogram and sonogram at [facility]: BI-RADS 3.\par 1.  January 2022 breast MRI recommended, 6-month follow-up to benign core biopsy.\par 2.  February 2022 left breast sonogram recommended for short-term reevaluation of previously noted finding at 3:00..\par \par \par September 9, 2021, the left nipple and areola became thickened.\par + Associated redness and tenderness.\par Painful and tender.\par No fever.\par Had some sweats, but not sure if related to possible infection, or due to perimenopause.\par \par September 13, 2021, she saw her internist.\par She was started on cefadroxil with improvement in her signs and symptoms, but not resolution.\par \par In retrospect, even prior to the above events (e.g. Labor Day 2021), the left nipple and areola although improved, and had never returned to normal.\par \par September 2021:\par My physical examination:\par Induration of the outer left nipple and areolar complex with associated erythema.\par Based on pictures she had from 2 weeks prior, her exam was significantly improved.\par \par \par August 2021: Referred by her internist (Dr. Jaeme BRAN) with LEFT NIPPLE inversion.\par \par This was a new finding in mid April, 2021.\par \par + Associated erythema, induration, and tenderness.\par \par No precipitating cause that she is aware of.\par No other signs or symptoms related to either breast.\par \par 4/28/2021:\par Diagnostic left mammogram and sonogram at [facility]: BI-RADS 3.\par Bilateral mammogram and ultrasound recommended for July 2021.\par \par She was treated with a 10-day course of oral antibiotics with some improvement, but without resolution.\par \par The above infectious/inflammatory changes recurred at the end of May 2021\par \par \par 7/22/2021:\par Left breast (retroareolar) core biopsy at [facility]:\par Benign and concordant.\par Chronic and focally acute inflammation associated with macrophages and rare multinucleated giant cells.\par Differential diagnosis includes healing abscess, and ruptured inflamed cyst.\par \par \par 7-16-21:\par Bilateral breast MRI at [facility]: BI-RADS 4.\par 1.  2.3 x 2.5 x 2.5 cm area of diffuse edema and skin thickening of the left nipple/areola/adjacent skin, with irregular, peripherally enhancing mass with multiple internal septations.\par 2.  Left breast (3:00) 8 cm FN:\par 6 x 4 x 5 mm nodule: Targeted ultrasound recommended.\par \par 7-22-21:\par Diagnostic left breast ultrasound at [facility]:\par 1.  Above retroareolar mass confirmed.\par Core biopsy recommended.\par Benign results (above), concordant.\par 2.  NO discrete sonographic finding in the left breast (3:00).\par With the above benign pathology report, 6-month follow-up MRI has been recommended for January 2022........ \par \par \par No other procedures related to either breast.\par No other personal history of breast disease.\par \par No personal history of malignancy.\par \par Not Ashkenazi.\par \par Menarche at 9.\par Nulliparous.\par LMP: 2/3/2021.\par No exogenous hormones.\par \par \par Breast cancer risk score = 22.1.\par \par \par No relatives with breast cancer.\par No relatives with ovarian cancer.\par \par Not Ashkenazi.\par \par Family history of malignancy:\par A sister had Hodgkin's disease at 17.\par \par \par PMD: Dr. Jamee BRAN.\par \par April 2019 she had a cardiology evaluation (Dr. Jay LISKER).\par She was experiencing palpitations.\par No underlying cardiovascular disease identified..\par \par No pacemaker or defibrillator.\par No anticoagulants.\par \par + Asthma.\par No current medical therapy warranted.\par Pulmonary: Dr. Ricardo RANDALL.\par \par History of GERD, diagnosed by EGD (below).\par Currently not requiring medical therapy.\par \par + Lower back pain with left lower extremity radicular symptoms.\par Orthopedics: Dr. Jay DOWNING.\par Has had physical therapy.\par Symptoms being treated with tizanidine.\par She now also sees a physiatrist (Dr. OCONNELL), she is scheduled for her first epidural injection 05/05/2023.\par \par + Effexor for perimenopausal symptoms.\par This was prescribed by her gynecologist.\par \par \par GYN: Dr. Liana ROY.\par April 2023 visit was unremarkable.\par She briefly saw Dr Roseanne Sheridan.\par \par \par April 2019: EGD (Dr. Jett CAZARES).\par Reflux esophagitis.\par \par October 2022:\par Colonoscopy okay x10 years.\par Dr. Simone ROSE

## 2023-05-01 NOTE — ASSESSMENT
[FreeTextEntry1] : Clinically doing well.\par \par \par August 2021:\par Bilateral mammogram and sonogram at [facility]: BI-RADS 3.\par \par 2/14/2022:\par Left breast sonogram at [facility]: BI-RADS 2.\par Interval resolution of previous hypoechoic area in the left retroareolar region.\par \par August 2022 bilateral mammogram and sonogram at [facility] was reportedly normal.\par Awaiting a copy of the results.  (10/3/2022:\par Callback left mammogram and sonogram at [facility]: BI-RADS 3.\par 6-month follow-up left breast ultrasound recommended for April 2023, prescription mailed 05/01/2023)\par Prescription given for August 2023\par \par \par \par 1/31/2022:\par Bilateral breast MRI at [facility]: BI-RADS 3.\par Significant improvement in skin thickening and edema of the left nipple and areolar region and adjacent skin.\par Also, peripherally enhancing masslike finding in the left retroareolar region has improved.\par 6-month follow-up MRI recommended for July 2022.\par She reports going in August 2022 and having a normal exam, awaiting an official report.\par (08/29/2022:\par Bilateral breast MRI at [facility]: BI-RADS 2).\par Gave her a prescription for a follow-up study in February 2024\par \par 
None

## 2023-05-01 NOTE — PHYSICAL EXAM
[Normal] : supple, no neck mass and thyroid not enlarged [Normal Neck Lymph Nodes] : normal neck lymph nodes  [Normal Supraclavicular Lymph Nodes] : normal supraclavicular lymph nodes [Normal Axillary Lymph Nodes] : normal axillary lymph nodes [Normal] : normal appearance, no rash, nodules, vesicles, ulcers, erythema [de-identified] : Groins not examined [de-identified] : Below

## 2023-05-01 NOTE — REASON FOR VISIT
[Follow-Up Visit] : a follow-up visit for [Other: _____] : [unfilled] [FreeTextEntry2] : Annual breast examination, breast cancer risk score = 22.1

## 2023-05-01 NOTE — REVIEW OF SYSTEMS
[Negative] : Psychiatric [FreeTextEntry6] : History of asthma [de-identified] : Low back pain [de-identified] : Perimenopausal symptoms [FreeTextEntry1] : Increased risk of breast cancer

## 2023-05-08 RX ORDER — FUROSEMIDE 20 MG/1
20 TABLET ORAL
Qty: 30 | Refills: 6 | Status: ACTIVE | COMMUNITY
Start: 2023-05-08 | End: 1900-01-01

## 2023-05-17 ENCOUNTER — NON-APPOINTMENT (OUTPATIENT)
Age: 48
End: 2023-05-17

## 2023-06-07 ENCOUNTER — APPOINTMENT (OUTPATIENT)
Dept: ORTHOPEDIC SURGERY | Facility: CLINIC | Age: 48
End: 2023-06-07
Payer: COMMERCIAL

## 2023-06-07 PROCEDURE — 99214 OFFICE O/P EST MOD 30 MIN: CPT | Mod: 95

## 2023-06-07 NOTE — ASSESSMENT
[FreeTextEntry1] : I had a long discussion with the patient in regards to her treatment plan and diagnosis.  We discussed various treatment options.  At this point we will proceed with continued conservative care focusing on core and lumbar strengthening through exercise.  She can take a muscle relaxer and Tylenol as needed for pain.  I will have her follow-up in approximately 6 weeks.  I encouraged her to reach out to me at any point if her symptoms worsen or change in any way.  We briefly discussed the long-term prognosis of her overall condition as well.

## 2023-06-07 NOTE — PHYSICAL EXAM
[de-identified] : Lumbar Physical Exam\par \par Antalgic gait, pain with heel walk, pain with toe walk\par \par Reflexes\par Patellar - normal\par Gastroc - normal\par Clonus - No\par \par Hip Exam - Normal\par \par Straight leg raise - none\par \par Pulses - 2+ dp/pt\par \par Range of motion - normal\par \par Sensation \par Sensation intact to light touch in L1, L2, L3, L4, L5 and S1 dermatomes bilaterally\par \par Motor\par 	IP	Quad	HS	TA	Gastroc	EHL\par Right	5/5	5/5	5/5	4+/5	5/5	5/5\par Left	5/5	5/5	5/5	4/5	5/5	5/5 [de-identified] : Lumbar radiographs\par No obvious instability noted\par Facet arthropathy\par \par Lumbar MRI\par L3-L4 and L4-L5 with severe spinal stenosis\par

## 2023-06-07 NOTE — HISTORY OF PRESENT ILLNESS
[de-identified] : Today the patient states that she is still dealing with back and leg symptoms.  Thankfully since the epidural she does feel like her pain has improved.  She denies any bowel bladder issues.  She denies any saddle anesthesia.  She does state that she has been diligently working with a  to regain strength in her back and legs.\par \par 05/01/23\par 47 year old female who presents for initial evaluation of her lower back pain.  The patient states since mid December she has had some fairly disabling bilateral lower extremity pain.  She has left-sided anterior lateral thigh, calf pain.  She also complains of severe right lower extremity pain, calf pain and posterior thigh pain.  She also feels like her right toes are curling.  She denies any bowel bladder issues.  She denies any saddle anesthesia.  She has had 2 rounds of oral steroids.  Unfortunately her symptoms have persisted.  She states that when the pain is bad she cannot walk.

## 2023-06-07 NOTE — REASON FOR VISIT
[Home] : at home, [unfilled] , at the time of the visit. [Medical Office: ([facility])___] : at the medical office located in  [Follow-Up Visit] : a follow-up visit for [Back Pain] : back pain [Radiculopathy] : radiculopathy

## 2023-06-11 PROBLEM — Z91.89 INCREASED RISK OF BREAST CANCER: Status: ACTIVE | Noted: 2023-03-26

## 2023-06-16 ENCOUNTER — APPOINTMENT (OUTPATIENT)
Dept: SURGICAL ONCOLOGY | Facility: CLINIC | Age: 48
End: 2023-06-16

## 2023-06-16 DIAGNOSIS — Z91.89 OTHER SPECIFIED PERSONAL RISK FACTORS, NOT ELSEWHERE CLASSIFIED: ICD-10-CM

## 2023-06-16 NOTE — REVIEW OF SYSTEMS
[Negative] : Integumentary [FreeTextEntry6] : Asthma [FreeTextEntry8] : Reflux [de-identified] : Chronic low back pain [de-identified] : Perimenopausal symptoms [FreeTextEntry1] : Increased risk of breast cancer

## 2023-06-16 NOTE — REASON FOR VISIT
[Other: _____] : [unfilled] [FreeTextEntry2] : 6/16/2023: She canceled her appointment for interval breast exam, breast cancer risk score = 22.1

## 2023-06-16 NOTE — PHYSICAL EXAM
[Normal] : supple, no neck mass and thyroid not enlarged [Normal Neck Lymph Nodes] : normal neck lymph nodes  [Normal Supraclavicular Lymph Nodes] : normal supraclavicular lymph nodes [Normal Axillary Lymph Nodes] : normal axillary lymph nodes [Normal] : normal appearance, no rash, nodules, vesicles, ulcers, erythema [de-identified] : Groins not examined [de-identified] : Below

## 2023-06-16 NOTE — HISTORY OF PRESENT ILLNESS
[de-identified] : 47 year-old lady.\par \par Most recently seen 05/01/2023:\par \par She was asymptomatic.\par Since ~mid March 2023 she had some left nipple retraction when in the shower, but it everted when she stepped out.\par No other concerns related to either breast.\par \par My PE PE:\par No discrete visible or palpable abnormality in either breast.\par Specifically, no nipple inversion noted on the left.\par No regional adenopathy.\par \par Breast imaging:\par August 2022 study at [facility]: BI-RADS 0.\par \par 10/3/2022:\par Diagnostic left mammogram and sonogram at [facility]: BI-RADS 3.\par Left breast (6:00, retroareolar) area of probable fat necrosis.\par 6-month follow-up left breast ultrasound recommended.\par I had mailed her a prescription for that study on May 1, 2023......... \par \par \par CC today:\par \par \par + Prior personal history:\par 10/26/2021:\par Incisional biopsy of the left nipple and areolar complex for chronic, intermittently infected and inflamed, process.\par Plastics: Dr. Erwin Pena.\par Surgical pathology:\par Unremarkable skin and fatty breast tissue.\par \par We spoke 11/10/2021.\par She was having some discomfort at the surgical site.\par Her first postoperative visit was 11/15/2021:\par My PE:\par + Induration at left breast biopsy site, with no worrisome findings.\par \par 12/13/2021:\par Scheduled follow-up visit\par She felt well.\par No constitutional or specific signs or symptoms.\par My PE: Minimal residual induration, no other inflammatory or infectious changes.\par \par \par Initially referred August 2021 with left nipple inversion since April 2021 (below).\par \par April 2021: Diagnostic left mammogram in  at [facility]: BI-RADS 3.\par \par Breast MRI was ordered by her internist\par July 2021 breast MRI at [facility]: BI-RADS 4.\par A peripheral left breast nodule was noted for which a targeted ultrasound was recommended.\par She initially saw Dr. Susan PALLESCHI.\par Left breast sonogram guided core biopsy at [facility]: benign and concordant: \par Chronic and focally acute inflammation associated with macrophages and rare multinucleated giant cells.\par \par \par August 2021:\par My PE confirmed the left nipple inversion.\par No other visible or palpable abnormalities noted.\par \par \par August 2021:\par Bilateral mammogram and sonogram at [facility]: BI-RADS 3.\par 1.  January 2022 breast MRI recommended, 6-month follow-up to benign core biopsy.\par 2.  February 2022 left breast sonogram recommended for short-term reevaluation of previously noted finding at 3:00..\par \par \par September 9, 2021, the left nipple and areola became thickened.\par + Associated redness and tenderness.\par Painful and tender.\par No fever.\par Had some sweats, but not sure if related to possible infection, or due to perimenopause.\par \par September 13, 2021, she saw her internist.\par She was started on cefadroxil with improvement in her signs and symptoms, but not resolution.\par \par In retrospect, even prior to the above events (e.g. Labor Day 2021), the left nipple and areola although improved, and had never returned to normal.\par \par September 2021:\par My physical examination:\par Induration of the outer left nipple and areolar complex with associated erythema.\par Based on pictures she had from 2 weeks prior, her exam was significantly improved.\par \par \par August 2021: Referred by her internist (Dr. Jamee BRAN) with LEFT NIPPLE inversion.\par \par This was a new finding in mid April, 2021.\par \par + Associated erythema, induration, and tenderness.\par \par No precipitating cause that she is aware of.\par No other signs or symptoms related to either breast.\par \par 4/28/2021:\par Diagnostic left mammogram and sonogram at [facility]: BI-RADS 3.\par Bilateral mammogram and ultrasound recommended for July 2021.\par \par She was treated with a 10-day course of oral antibiotics with some improvement, but without resolution.\par \par The above infectious/inflammatory changes recurred at the end of May 2021\par \par \par 7/22/2021:\par Left breast (retroareolar) core biopsy at [facility]:\par Benign and concordant.\par Chronic and focally acute inflammation associated with macrophages and rare multinucleated giant cells.\par Differential diagnosis includes healing abscess, and ruptured inflamed cyst.\par \par \par 7-16-21:\par Bilateral breast MRI at [facility]: BI-RADS 4.\par 1.  2.3 x 2.5 x 2.5 cm area of diffuse edema and skin thickening of the left nipple/areola/adjacent skin, with irregular, peripherally enhancing mass with multiple internal septations.\par 2.  Left breast (3:00) 8 cm FN:\par 6 x 4 x 5 mm nodule: Targeted ultrasound recommended.\par \par 7-22-21:\par Diagnostic left breast ultrasound at [facility]:\par 1.  Above retroareolar mass confirmed.\par Core biopsy recommended.\par Benign results (above), concordant.\par 2.  NO discrete sonographic finding in the left breast (3:00).\par With the above benign pathology report, 6-month follow-up MRI has been recommended for January 2022........ \par \par \par No other procedures related to either breast.\par No other personal history of breast disease.\par \par No personal history of malignancy.\par \par Not Ashkenazi.\par \par Menarche at 9.\par Nulliparous.\par LMP: 2/3/2021.\par No exogenous hormones.\par \par \par Breast cancer risk score = 22.1.\par \par \par No relatives with breast cancer.\par No relatives with ovarian cancer.\par \par Not Ashkenazi.\par \par Family history of malignancy:\par A sister had Hodgkin's disease at 17.\par \par \par PMD: Dr. Jamee BRAN.\par \par April 2019 she had a cardiology evaluation (Dr. Jay LISKER).\par She was experiencing palpitations.\par No underlying cardiovascular disease identified..\par \par No pacemaker or defibrillator.\par No anticoagulants.\par \par + Asthma.\par No current medical therapy warranted.\par Pulmonary: Dr. Ricardo RANDALL.\par \par History of GERD, diagnosed by EGD (below).\par Currently not requiring medical therapy.\par \par + Lower back pain with left lower extremity radicular symptoms.\par Orthopedics: Dr. Jay DOWNING.\par Has had physical therapy.\par Symptoms being treated with tizanidine.\par She now also sees a physiatrist (Dr. OCONNELL), she is scheduled for her first epidural injection 05/05/2023.\par \par + Effexor for perimenopausal symptoms.\par This was prescribed by her gynecologist.\par \par \par GYN: Dr. Liana ROY.\par April 2023 visit was unremarkable.\par She briefly saw Dr Roseanne Sheridan.\par \par \par April 2019: EGD (Dr. Jett CAZARES).\par Reflux esophagitis.\par \par October 2022:\par Colonoscopy okay x10 years.\par Dr. Simone ROSE

## 2023-06-16 NOTE — ASSESSMENT
[FreeTextEntry1] : Breast imaging:\par August 2022 study at [facility]: BI-RADS 0.\par \par 10/3/2022:\par Diagnostic left mammogram and sonogram at [facility]: BI-RADS 3.\par Left breast (6:00, retroareolar) area of probable fat necrosis.\par 6-month follow-up left breast ultrasound recommended.\par I had mailed her a prescription for that study on May 1, 2023......... \par \par \par Breast imaging:\par August 2022 study at [facility]: BI-RADS 0.\par \par 10/3/2022:\par Diagnostic left mammogram and sonogram at [facility]: BI-RADS 3.\par Left breast (6:00, retroareolar) area of probable fat necrosis.\par 6-month follow-up left breast ultrasound recommended.\par I had mailed her a prescription for that study on May 1, 2023......... \par \par \par 6/16/2023: She canceled her appointment for interval breast exam, breast cancer risk score = 22.1

## 2023-06-20 ENCOUNTER — APPOINTMENT (OUTPATIENT)
Dept: CARDIOLOGY | Facility: CLINIC | Age: 48
End: 2023-06-20
Payer: COMMERCIAL

## 2023-06-20 VITALS
DIASTOLIC BLOOD PRESSURE: 103 MMHG | OXYGEN SATURATION: 100 % | HEART RATE: 92 BPM | BODY MASS INDEX: 29.1 KG/M2 | WEIGHT: 149 LBS | SYSTOLIC BLOOD PRESSURE: 162 MMHG

## 2023-06-20 VITALS — SYSTOLIC BLOOD PRESSURE: 130 MMHG | DIASTOLIC BLOOD PRESSURE: 85 MMHG

## 2023-06-20 DIAGNOSIS — R61 GENERALIZED HYPERHIDROSIS: ICD-10-CM

## 2023-06-20 DIAGNOSIS — R60.0 LOCALIZED EDEMA: ICD-10-CM

## 2023-06-20 PROCEDURE — 99203 OFFICE O/P NEW LOW 30 MIN: CPT

## 2023-06-20 NOTE — DISCUSSION/SUMMARY
[FreeTextEntry1] : In summary,  is a 47-year-old female who is noticing excessive sweating and pedal edema.  She is asymptomatic.  Her exam shows regular rhythm, normal blood pressure, clear lungs, and 1+ pedal edema.  A recent EKG done at her internist office is within normal limits.\par \par There is no evidence of heart disease and I reassured her that she was fine.  I do not think she requires any testing.  She will follow-up here on a as needed basis.

## 2023-06-20 NOTE — HISTORY OF PRESENT ILLNESS
[FreeTextEntry1] : 47-year-old female self-referred for cardiac evaluation because of pedal edema and excessive sweating.  She has no prior history of heart disease and is in good general health.  She was seen by us in 2019 for palpitations and had a normal echo and Holter done at that time.\par \par She reports she is finishing menopause and has been increasing her activities with a  during the past 6 months.  She is not having any difficulty as she increases her activity.  However, she thinks her sweating is excessive.  She also notes edema in her feet which can diminish her arches.  She has developed spinal stenosis and has been taking nonsteroidals intermittently.  However the edema persists after stopping them.

## 2023-07-06 ENCOUNTER — APPOINTMENT (OUTPATIENT)
Dept: ORTHOPEDIC SURGERY | Facility: CLINIC | Age: 48
End: 2023-07-06

## 2023-07-11 ENCOUNTER — APPOINTMENT (OUTPATIENT)
Dept: ORTHOPEDIC SURGERY | Facility: CLINIC | Age: 48
End: 2023-07-11
Payer: COMMERCIAL

## 2023-07-11 VITALS
HEIGHT: 60 IN | WEIGHT: 150 LBS | SYSTOLIC BLOOD PRESSURE: 124 MMHG | DIASTOLIC BLOOD PRESSURE: 83 MMHG | BODY MASS INDEX: 29.45 KG/M2 | HEART RATE: 85 BPM

## 2023-07-11 DIAGNOSIS — M48.07 SPINAL STENOSIS, LUMBOSACRAL REGION: ICD-10-CM

## 2023-07-11 PROCEDURE — 99213 OFFICE O/P EST LOW 20 MIN: CPT

## 2023-07-11 RX ORDER — MELOXICAM 15 MG/1
15 TABLET ORAL DAILY
Qty: 14 | Refills: 0 | Status: ACTIVE | COMMUNITY
Start: 2023-07-11 | End: 1900-01-01

## 2023-07-11 NOTE — HISTORY OF PRESENT ILLNESS
[de-identified] : 48 yo female with increasing diffuse pain and sensitivity to light touch. Patient states she feels as though the epidural has worn off, though her pain is now different compared to previously. She notes her joints hurt, she is most stiff when she first wakes in the morning then feels better as she starts moving then by around 7pm she is back in severe diffuse pain. She is participating in yoga, pilates, and swim weekly. Her neurologist has her on 300mg of Gabapentin a day. She states she is finding it harder to get through her day at work with her pain. She denies any bowel or bladder dysfunction or saddle anesthesia. She also takes Tylenol with minimal relief of her symptoms. \par \par 6/7/23\par Today the patient states that she is still dealing with back and leg symptoms.  Thankfully since the epidural she does feel like her pain has improved.  She denies any bowel bladder issues.  She denies any saddle anesthesia.  She does state that she has been diligently working with a  to regain strength in her back and legs.\par \par 05/01/23\par 47 year old female who presents for initial evaluation of her lower back pain.  The patient states since mid December she has had some fairly disabling bilateral lower extremity pain.  She has left-sided anterior lateral thigh, calf pain.  She also complains of severe right lower extremity pain, calf pain and posterior thigh pain.  She also feels like her right toes are curling.  She denies any bowel bladder issues.  She denies any saddle anesthesia.  She has had 2 rounds of oral steroids.  Unfortunately her symptoms have persisted.  She states that when the pain is bad she cannot walk.

## 2023-07-11 NOTE — REVIEW OF SYSTEMS
[Negative] : Heme/Lymph [FreeTextEntry9] : Back, hip, leg, arm pain [de-identified] : left anterior thigh numbness, right plantar aspect of foot pins and needles

## 2023-07-11 NOTE — PHYSICAL EXAM
[de-identified] : Lumbar Physical Exam\par \par Antalgic gait, pain with heel walk, pain with toe walk\par Patient continuously sat, stood, and paced in the office during her visit today due to pain as per patient. \par \par Reflexes\par Patellar - normal\par Gastroc - normal\par Clonus - No\par \par Hip Exam - Normal\par \par Straight leg raise - none\par \par Pulses - 2+ dp/pt\par \par Range of motion - normal\par \par Sensation \par Sensation intact to light touch in L1, L2, L3, L4, L5 and S1 dermatomes bilaterally, decreased sensation anterior left thigh\par \par Motor\par 	IP	Quad	HS	TA	Gastroc	EHL\par Right	5/5	5/5	5/5	4+/5	5/5	5/5\par Left	5/5	5/5	5/5	4/5	5/5	5/5 [de-identified] : Previous imaging:\par Lumbar radiographs\par No obvious instability noted\par Facet arthropathy\par \par Lumbar MRI\par L3-L4 and L4-L5 with severe spinal stenosis\par

## 2023-07-11 NOTE — ASSESSMENT
[FreeTextEntry1] : This is a 47-year-old female with an exacerbation of her back pain as well as complaints of diffuse joint and body aches.  Patient is very concerned with the extent in which she is feeling unwell at this time which limits her abilities to be active and socialize.  Patient is trying to participate in swim, yoga, and Pilates however the classes that she is taking are very low impact based on her complaints and limited ability.  At this time based on her concerns an MRI of her cervical and thoracic spine have been ordered to further evaluate her neural axis as well as a referral for rheumatology based on her complaints and a prescription for meloxicam and tizanidine was also provided.  Patient will follow-up in 3 to 4 weeks, sooner based on availability to obtain the MRIs for follow-up.  I did also discuss with patient to follow-up with pain management for other nonsurgical treatment modalities to try and get her pain better under control and she states she has an appointment in the upcoming weeks.  She is not interested in any epidurals at this time based on her previous experience however I did review with her that pain management may be able to provide more insight as to both nonpharmacological and pharmacological treatment of her pain symptoms at this time.  I encouraged the patient to continue to move is much as she is able and to try not to be sedentary.  All patient questions answered to her satisfaction.

## 2023-07-20 ENCOUNTER — APPOINTMENT (OUTPATIENT)
Dept: INTERNAL MEDICINE | Facility: CLINIC | Age: 48
End: 2023-07-20
Payer: COMMERCIAL

## 2023-07-20 VITALS
WEIGHT: 150 LBS | BODY MASS INDEX: 29.45 KG/M2 | DIASTOLIC BLOOD PRESSURE: 80 MMHG | SYSTOLIC BLOOD PRESSURE: 120 MMHG | HEIGHT: 60 IN

## 2023-07-20 DIAGNOSIS — F32.A DEPRESSION, UNSPECIFIED: ICD-10-CM

## 2023-07-20 DIAGNOSIS — G47.9 SLEEP DISORDER, UNSPECIFIED: ICD-10-CM

## 2023-07-20 PROCEDURE — 99213 OFFICE O/P EST LOW 20 MIN: CPT

## 2023-07-20 RX ORDER — PANTOPRAZOLE 40 MG/1
40 TABLET, DELAYED RELEASE ORAL DAILY
Qty: 14 | Refills: 0 | Status: COMPLETED | COMMUNITY
Start: 2023-02-27 | End: 2023-07-20

## 2023-07-20 RX ORDER — TIZANIDINE 2 MG/1
2 TABLET ORAL EVERY 6 HOURS
Qty: 56 | Refills: 0 | Status: COMPLETED | COMMUNITY
Start: 2023-06-23 | End: 2023-07-20

## 2023-07-20 RX ORDER — OMEPRAZOLE 40 MG/1
40 CAPSULE, DELAYED RELEASE ORAL
Qty: 90 | Refills: 0 | Status: ACTIVE | COMMUNITY
Start: 2023-07-20 | End: 1900-01-01

## 2023-07-20 RX ORDER — PANTOPRAZOLE 40 MG/1
40 TABLET, DELAYED RELEASE ORAL
Qty: 90 | Refills: 1 | Status: COMPLETED | COMMUNITY
Start: 2022-10-28 | End: 2023-07-20

## 2023-07-20 RX ORDER — TIZANIDINE 2 MG/1
2 TABLET ORAL EVERY 6 HOURS
Qty: 90 | Refills: 0 | Status: COMPLETED | COMMUNITY
Start: 2023-03-13 | End: 2023-07-20

## 2023-07-24 ENCOUNTER — TRANSCRIPTION ENCOUNTER (OUTPATIENT)
Age: 48
End: 2023-07-24

## 2023-07-24 ENCOUNTER — APPOINTMENT (OUTPATIENT)
Dept: ORTHOPEDIC SURGERY | Facility: CLINIC | Age: 48
End: 2023-07-24
Payer: COMMERCIAL

## 2023-07-24 VITALS
HEIGHT: 60 IN | WEIGHT: 150 LBS | SYSTOLIC BLOOD PRESSURE: 138 MMHG | DIASTOLIC BLOOD PRESSURE: 82 MMHG | BODY MASS INDEX: 29.45 KG/M2

## 2023-07-24 DIAGNOSIS — M79.2 NEURALGIA AND NEURITIS, UNSPECIFIED: ICD-10-CM

## 2023-07-24 PROCEDURE — 99214 OFFICE O/P EST MOD 30 MIN: CPT

## 2023-07-24 NOTE — ASSESSMENT
[FreeTextEntry1] : This is a 47-year-old female with an exacerbation of her back pain as well as complaints of diffuse joint and body aches.  Patient is very concerned with the extent in which she is feeling unwell at this time which limits her abilities to be active and socialize.  Patient is trying to participate in swim, yoga, and Pilates however the classes that she is taking are very low impact based on her complaints and limited ability.  At this time based on her concerns an MRI of her cervical and thoracic spine have been ordered to further evaluate her neural axis. Patient will follow-up in 3 to 4 weeks, sooner based on availability to obtain the MRIs for follow-up.  I did also discuss with patient to continue to follow-up with pain management for other nonsurgical treatment modalities to try and get her pain better under control and she states she has an appointment in the upcoming weeks.  She is not interested in any epidurals at this time based on her previous experience however I did review with her that pain management may be able to provide more insight as to both nonpharmacological and pharmacological treatment of her pain symptoms at this time. Encouraged patient to continue to f/u with rheumatology. I encouraged the patient to continue to move is much as she is able and to try not to be sedentary.  All patient questions answered to her satisfaction. \par Offered to the patient mental health resources but she reports she has her own therapist that she follows up with

## 2023-07-24 NOTE — HISTORY OF PRESENT ILLNESS
[de-identified] : 47 year old female who presents for follow-up evaluation of her lower back pain and radicular sxs down into her legs. She reports using meloxicam with minor relief of sxs. She reports difficulty with most ROMs. She reports having an injection with Dr. Mabry which she reports provided little to no relief. Reports upping her gabapentin to 600mg. Reports pain with movement and with performing daily activities of life and work. Reports she is scheduled to see a Rheumatologist in August. \par Denies any bowel/bladder incontinence. Denies any saddle anesthesia. \par \par 07/11/2023\par 48 yo female with increasing diffuse pain and sensitivity to light touch. Patient states she feels as though the epidural has worn off, though her pain is now different compared to previously. She notes her joints hurt, she is most stiff when she first wakes in the morning then feels better as she starts moving then by around 7pm she is back in severe diffuse pain. She is participating in yoga, pilates, and swim weekly. Her neurologist has her on 300mg of Gabapentin a day. She states she is finding it harder to get through her day at work with her pain. She denies any bowel or bladder dysfunction or saddle anesthesia. She also takes Tylenol with minimal relief of her symptoms. \par \par 6/7/23\par Today the patient states that she is still dealing with back and leg symptoms.  Thankfully since the epidural she does feel like her pain has improved.  She denies any bowel bladder issues.  She denies any saddle anesthesia.  She does state that she has been diligently working with a  to regain strength in her back and legs.\par \par 05/01/23\par 47 year old female who presents for initial evaluation of her lower back pain.  The patient states since mid December she has had some fairly disabling bilateral lower extremity pain.  She has left-sided anterior lateral thigh, calf pain.  She also complains of severe right lower extremity pain, calf pain and posterior thigh pain.  She also feels like her right toes are curling.  She denies any bowel bladder issues.  She denies any saddle anesthesia.  She has had 2 rounds of oral steroids.  Unfortunately her symptoms have persisted.  She states that when the pain is bad she cannot walk. 17

## 2023-07-24 NOTE — PHYSICAL EXAM
[de-identified] : Cervical Physical Exam\par \par Gait - Ataxia \par difficulty with tandem gait \par \par Station - Normal\par \par Sagittal balance - Normal\par \par Compensatory mechanism? - None\par \par Horizontal gaze - Maintained\par \par Heel walk - Normal\par \par Toe walk - Normal\par \par Reflexes\par Biceps - Normal\par Triceps - Normal\par Brachioradialis - Normal\par Patellar - Normal\par Gastroc - Normal\par Clonus -No\par \par Hudson´s - positive on the RT \par \par Shoulder Exam - Normal\par \par Spurling´s - None\par \par Wrist Pulses -2+ radial/ulnar\par \par Foot Pulses -2+ DP/PT\par \par Cervical range of motion - Normal\par \par Sensation\par C5-T1 sensation intact to light touch bilaterally\par \par L1-S1 sensation intact to light touch bilaterally\par \par Motor\par \par 	Deltoid	Biceps	Triceps	WF	WE	IO	\par Right	3+/5	5/5	5/5	5/5	5/5	5/5	5/5\par Left	5/5	5/5	5/5	5/5	5/5	5/5	5/5\par \par \par 	IP	Quad	HS	TA	Gastroc	EHL\par Right	5/5	5/5	5/5	4+/5	5/5	5/5\par Left	5/5	5/5	5/5	5/5	5/5	5/5  [de-identified] : Previous imaging:\par Lumbar radiographs\par No obvious instability noted\par Facet arthropathy\par \par Lumbar MRI\par L3-L4 and L4-L5 with severe spinal stenosis\par

## 2023-07-24 NOTE — REVIEW OF SYSTEMS
[Negative] : Heme/Lymph [FreeTextEntry9] : Back, hip, leg, arm pain [de-identified] : left anterior thigh numbness, right plantar aspect of foot pins and needles

## 2023-07-24 NOTE — ADDENDUM
[FreeTextEntry1] : I, Anamaria Andino, acted solely as a scribe for Dr. Jay Sutherland MD on this date 07/24/2023  \par \par All medical record entries made by the Scribe were at my, Dr. Jay Sutherland MD., direction and personally dictated by me on 07/24/2023 . I have reviewed the chart and agree that the record accurately reflects my personal performance of the history, physical exam, assessment and plan. I have also personally directed, reviewed, and agreed with the chart.

## 2023-07-27 ENCOUNTER — NON-APPOINTMENT (OUTPATIENT)
Age: 48
End: 2023-07-27

## 2023-07-27 ENCOUNTER — APPOINTMENT (OUTPATIENT)
Dept: CT IMAGING | Facility: CLINIC | Age: 48
End: 2023-07-27
Payer: SELF-PAY

## 2023-07-27 ENCOUNTER — OUTPATIENT (OUTPATIENT)
Dept: OUTPATIENT SERVICES | Facility: HOSPITAL | Age: 48
LOS: 1 days | End: 2023-07-27
Payer: SELF-PAY

## 2023-07-27 DIAGNOSIS — Z00.00 ENCOUNTER FOR GENERAL ADULT MEDICAL EXAMINATION WITHOUT ABNORMAL FINDINGS: ICD-10-CM

## 2023-07-27 DIAGNOSIS — Z00.8 ENCOUNTER FOR OTHER GENERAL EXAMINATION: ICD-10-CM

## 2023-07-27 DIAGNOSIS — Z98.890 OTHER SPECIFIED POSTPROCEDURAL STATES: Chronic | ICD-10-CM

## 2023-07-27 PROCEDURE — 75571 CT HRT W/O DYE W/CA TEST: CPT

## 2023-07-27 PROCEDURE — 75571 CT HRT W/O DYE W/CA TEST: CPT | Mod: 26

## 2023-07-28 ENCOUNTER — TRANSCRIPTION ENCOUNTER (OUTPATIENT)
Age: 48
End: 2023-07-28

## 2023-08-07 ENCOUNTER — LABORATORY RESULT (OUTPATIENT)
Age: 48
End: 2023-08-07

## 2023-08-07 ENCOUNTER — APPOINTMENT (OUTPATIENT)
Dept: GASTROENTEROLOGY | Facility: CLINIC | Age: 48
End: 2023-08-07
Payer: COMMERCIAL

## 2023-08-07 VITALS
HEART RATE: 82 BPM | DIASTOLIC BLOOD PRESSURE: 80 MMHG | OXYGEN SATURATION: 99 % | TEMPERATURE: 98.9 F | WEIGHT: 160 LBS | BODY MASS INDEX: 31.41 KG/M2 | SYSTOLIC BLOOD PRESSURE: 138 MMHG | HEIGHT: 60 IN

## 2023-08-07 DIAGNOSIS — Z91.018 ALLERGY TO OTHER FOODS: ICD-10-CM

## 2023-08-07 DIAGNOSIS — R43.9 UNSPECIFIED DISTURBANCES OF SMELL AND TASTE: ICD-10-CM

## 2023-08-07 DIAGNOSIS — K21.9 GASTRO-ESOPHAGEAL REFLUX DISEASE W/OUT ESOPHAGITIS: ICD-10-CM

## 2023-08-07 PROCEDURE — 99213 OFFICE O/P EST LOW 20 MIN: CPT

## 2023-08-07 RX ORDER — ESOMEPRAZOLE MAGNESIUM 40 MG/1
40 CAPSULE, DELAYED RELEASE ORAL
Qty: 90 | Refills: 1 | Status: ACTIVE | COMMUNITY
Start: 2023-08-07 | End: 1900-01-01

## 2023-08-07 NOTE — HISTORY OF PRESENT ILLNESS
[FreeTextEntry1] :  Had last seen me years ago for bloating and gas and GERD  Has seen other GI in 2022 for EGD (esophagitis) and colonoscopy (normal) Took pantoprazole - helped until insurance stopped convering it  In December slipped a disc and is taking new medication - Effexor (cannot taper off), meloxicam, gabapentin, Xanax prn  Has GERD - daily; has known triggers (Greek salad with tomatoes) To see [facility] on 8/24  Has altered sense of taste and smell for the last few months Cannot eat meat - texture is nauseating.  Last had covid uner 2 years ago  Sees neurologist for back pain

## 2023-08-09 LAB
GALACTOSE, ALPHA (O215) CONC: <0.1 KUA/L
GALACTOSE-ALPHA-1,3-GALACTOSE IGE: 0

## 2023-08-21 ENCOUNTER — NON-APPOINTMENT (OUTPATIENT)
Age: 48
End: 2023-08-21

## 2023-08-21 ENCOUNTER — APPOINTMENT (OUTPATIENT)
Dept: RHEUMATOLOGY | Facility: CLINIC | Age: 48
End: 2023-08-21
Payer: COMMERCIAL

## 2023-08-21 VITALS
DIASTOLIC BLOOD PRESSURE: 83 MMHG | SYSTOLIC BLOOD PRESSURE: 121 MMHG | BODY MASS INDEX: 29.45 KG/M2 | OXYGEN SATURATION: 98 % | HEART RATE: 91 BPM | HEIGHT: 60 IN | WEIGHT: 150 LBS

## 2023-08-21 DIAGNOSIS — M25.50 PAIN IN UNSPECIFIED JOINT: ICD-10-CM

## 2023-08-21 DIAGNOSIS — M54.50 LOW BACK PAIN, UNSPECIFIED: ICD-10-CM

## 2023-08-21 DIAGNOSIS — M79.642 PAIN IN RIGHT HAND: ICD-10-CM

## 2023-08-21 DIAGNOSIS — G62.9 POLYNEUROPATHY, UNSPECIFIED: ICD-10-CM

## 2023-08-21 DIAGNOSIS — M79.641 PAIN IN RIGHT HAND: ICD-10-CM

## 2023-08-21 PROCEDURE — 99204 OFFICE O/P NEW MOD 45 MIN: CPT

## 2023-08-21 NOTE — CONSULT LETTER
[Dear  ___] : Dear  [unfilled], [Please see my note below.] : Please see my note below. [Sincerely,] : Sincerely, [FreeTextEntry3] : Jacquelyn Sosa MD , Iain FAJARDO at [facility] Division of Rheumatology [DrVelasquez  ___] : Dr. BRAUN

## 2023-08-21 NOTE — PHYSICAL EXAM
[General Appearance - Alert] : alert [General Appearance - In No Acute Distress] : in no acute distress [Respiration, Rhythm And Depth] : normal respiratory rhythm and effort [Musculoskeletal - Swelling] : no joint swelling seen [No Focal Deficits] : no focal deficits [Impaired Insight] : insight and judgment were intact

## 2023-08-21 NOTE — HISTORY OF PRESENT ILLNESS
[FreeTextEntry1] : 47-year-old woman, referred for evaluation of joint pain  -Onset around mid December 2022 -low back pain Started when she was leaning down to get dressed radiates to bilateral legs/thighs "tooth ache in my bones" Is following with spine Ortho, neurology Doing PT regularly -does yoga and Pilate regularly swimming regularly helps with pain -scheduled for MRI spine today  -started having other joint pain hand pain, numbness as per neuro possible CTS- scheduled for EMG  -bilateral knee pain, achiness intermittent stiffness for few minutes in the am then improves during the day -Pain at right shoulder -Taking meloxicam as per Ortho Not taking tizanidine Gabapentin started by neurology, recently increased to 600 mg daily

## 2023-08-21 NOTE — CONSULT LETTER
[Dear  ___] : Dear  [unfilled], [Please see my note below.] : Please see my note below. [Sincerely,] : Sincerely, [FreeTextEntry3] : Jacquelyn Sosa MD , Iain FAJARDO at [facility] Division of Rheumatology [DrVelasquez  ___] : Dr. BRAUN

## 2023-08-21 NOTE — ASSESSMENT
[FreeTextEntry1] : #Low back pain, lumbar radiculopathy #Generalized joint and body aches -Overall appear noninflammatory in pattern No evidence of synovitis on exam, normal ROM of tested joints -Strong family history of RA  [] Patient will be completing an MRI today [] Scheduled to get EMG/NCV testing in October/November Which rule out small fiber neuropathy [] Obtain inflammatory markers, check RF, CCP and Sjogren antibodies Patient reports having extensive blood work recently with neurology including a " rheumatologic panel" We will get the records for review before ordering additional blood work [] Continue with PT and regular/stretching [] Titrate up gabapentin dose as tolerated   Follow-up after above

## 2023-08-22 ENCOUNTER — NON-APPOINTMENT (OUTPATIENT)
Age: 48
End: 2023-08-22

## 2023-08-23 ENCOUNTER — TRANSCRIPTION ENCOUNTER (OUTPATIENT)
Age: 48
End: 2023-08-23

## 2023-09-06 ENCOUNTER — APPOINTMENT (OUTPATIENT)
Dept: ORTHOPEDIC SURGERY | Facility: CLINIC | Age: 48
End: 2023-09-06
Payer: COMMERCIAL

## 2023-09-06 PROCEDURE — 99214 OFFICE O/P EST MOD 30 MIN: CPT | Mod: 95

## 2023-09-06 RX ORDER — MELOXICAM 15 MG/1
15 TABLET ORAL DAILY
Qty: 1 | Refills: 2 | Status: ACTIVE | COMMUNITY
Start: 2023-09-06 | End: 1900-01-01

## 2023-09-06 NOTE — HISTORY OF PRESENT ILLNESS
[de-identified] : Today the patient states overall she is doing better.  She has made some fairly significant life choices to help her with her pain symptoms.  This has included dropping down to a 4-day a week schedule.  She is also diligently been working with physical therapy, home exercise program to help herself with her symptoms.  All of these things combined have helped her.  She denies any severe radicular type pain currently.  She denies any bowel bladder issues.  She denies any saddle anesthesia.  7/24/23 Today the patient states that she is still dealing with back and leg symptoms.  Thankfully since the epidural she does feel like her pain has improved.  She denies any bowel bladder issues.  She denies any saddle anesthesia.  She does state that she has been diligently working with a  to regain strength in her back and legs.  05/01/23 47 year old female who presents for initial evaluation of her lower back pain.  The patient states since mid December she has had some fairly disabling bilateral lower extremity pain.  She has left-sided anterior lateral thigh, calf pain.  She also complains of severe right lower extremity pain, calf pain and posterior thigh pain.  She also feels like her right toes are curling.  She denies any bowel bladder issues.  She denies any saddle anesthesia.  She has had 2 rounds of oral steroids.  Unfortunately her symptoms have persisted.  She states that when the pain is bad she cannot walk.

## 2023-09-06 NOTE — PHYSICAL EXAM
[de-identified] : Lumbar Physical Exam  Gait improved today  Reflexes Patellar - normal Gastroc - normal Clonus - No  Hip Exam - Normal  Straight leg raise - none  Pulses - 2+ dp/pt  Range of motion - normal  Sensation  Sensation intact to light touch in L1, L2, L3, L4, L5 and S1 dermatomes bilaterally  Motor 	IP	Quad	HS	TA	Gastroc	EHL Right	5/5	5/5	5/5	4+/5	5/5	5/5 Left	5/5	5/5	5/5	4/5	5/5	5/5

## 2023-09-06 NOTE — REASON FOR VISIT
[Follow-Up Visit] : a follow-up visit for [Back Pain] : back pain [Radiculopathy] : radiculopathy [TextEntry] : The patient did agree to proceed with a telehealth visit via the online secure portal.  Proper informed consent was obtained.  I was in my office and the patient was in her home.

## 2023-10-04 ENCOUNTER — APPOINTMENT (OUTPATIENT)
Dept: INTERNAL MEDICINE | Facility: CLINIC | Age: 48
End: 2023-10-04

## 2023-11-07 ENCOUNTER — APPOINTMENT (OUTPATIENT)
Dept: GASTROENTEROLOGY | Facility: CLINIC | Age: 48
End: 2023-11-07

## 2023-11-17 PROBLEM — G47.9 SLEEP DISTURBANCE: Status: ACTIVE | Noted: 2021-09-10

## 2023-11-17 PROBLEM — F32.A MILD DEPRESSION: Status: ACTIVE | Noted: 2021-03-15

## 2023-11-22 ENCOUNTER — APPOINTMENT (OUTPATIENT)
Dept: ORTHOPEDIC SURGERY | Facility: CLINIC | Age: 48
End: 2023-11-22

## 2023-12-05 ENCOUNTER — TRANSCRIPTION ENCOUNTER (OUTPATIENT)
Age: 48
End: 2023-12-05

## 2023-12-05 ENCOUNTER — APPOINTMENT (OUTPATIENT)
Dept: INTERNAL MEDICINE | Facility: CLINIC | Age: 48
End: 2023-12-05
Payer: COMMERCIAL

## 2023-12-05 DIAGNOSIS — U07.1 COVID-19: ICD-10-CM

## 2023-12-05 DIAGNOSIS — J01.90 ACUTE SINUSITIS, UNSPECIFIED: ICD-10-CM

## 2023-12-05 PROCEDURE — 99213 OFFICE O/P EST LOW 20 MIN: CPT | Mod: 95

## 2023-12-05 RX ORDER — FLUTICASONE PROPIONATE 50 UG/1
50 SPRAY, METERED NASAL TWICE DAILY
Qty: 1 | Refills: 3 | Status: ACTIVE | COMMUNITY
Start: 2023-12-05 | End: 1900-01-01

## 2023-12-06 ENCOUNTER — APPOINTMENT (OUTPATIENT)
Dept: ORTHOPEDIC SURGERY | Facility: CLINIC | Age: 48
End: 2023-12-06

## 2023-12-07 ENCOUNTER — TRANSCRIPTION ENCOUNTER (OUTPATIENT)
Age: 48
End: 2023-12-07

## 2023-12-07 PROBLEM — J01.90 ACUTE SINUSITIS: Status: ACTIVE | Noted: 2017-09-11

## 2023-12-22 ENCOUNTER — APPOINTMENT (OUTPATIENT)
Dept: INTERNAL MEDICINE | Facility: CLINIC | Age: 48
End: 2023-12-22
Payer: COMMERCIAL

## 2023-12-22 DIAGNOSIS — F41.9 ANXIETY DISORDER, UNSPECIFIED: ICD-10-CM

## 2023-12-22 DIAGNOSIS — R05.9 COUGH, UNSPECIFIED: ICD-10-CM

## 2023-12-22 DIAGNOSIS — J40 BRONCHITIS, NOT SPECIFIED AS ACUTE OR CHRONIC: ICD-10-CM

## 2023-12-22 PROCEDURE — 99214 OFFICE O/P EST MOD 30 MIN: CPT | Mod: 95

## 2023-12-22 NOTE — ASSESSMENT
[FreeTextEntry1] : COUGH GETTING WORSE I started patient empirically on prednisone taper for worsening cough and congestion.  F/U 1 WK IF NOT BETTER If patient develops SOB, patient will call me All questions and concerns were discussed.   predniSONE RX SENT predniSONE DIRECTIONS DISCUSSED AND EXPLAINED WITH PATIENT

## 2023-12-22 NOTE — HISTORY OF PRESENT ILLNESS
[Home] : at home, [unfilled] , at the time of the visit. [Medical Office: ([facility])___] : at the medical office located in  [Verbal consent obtained from patient] : the patient, [unfilled] [Cold Symptoms] : cold symptoms [Sore Throat] : sore throat [Moderate] : moderate [___ Weeks ago] :  [unfilled] weeks ago [Congestion] : congestion [Cough] : cough [Worsening] : worsening [FreeTextEntry8] : WAS TESTED POITIVE 12/5/2023

## 2024-01-03 ENCOUNTER — TRANSCRIPTION ENCOUNTER (OUTPATIENT)
Age: 49
End: 2024-01-03

## 2024-01-03 DIAGNOSIS — R91.1 SOLITARY PULMONARY NODULE: ICD-10-CM

## 2024-01-17 ENCOUNTER — APPOINTMENT (OUTPATIENT)
Age: 49
End: 2024-01-17
Payer: COMMERCIAL

## 2024-01-17 PROCEDURE — 99204 OFFICE O/P NEW MOD 45 MIN: CPT

## 2024-01-29 ENCOUNTER — APPOINTMENT (OUTPATIENT)
Dept: ORTHOPEDIC SURGERY | Facility: CLINIC | Age: 49
End: 2024-01-29
Payer: COMMERCIAL

## 2024-01-29 PROCEDURE — 99214 OFFICE O/P EST MOD 30 MIN: CPT

## 2024-01-29 NOTE — ASSESSMENT
[FreeTextEntry1] : I had a lengthy discussion with the patient in regards to the treatment plan and diagnosis. The patient does have objective weakness findings on my exam. Furthermore I am concerned in regards to compression along the patient's spinal cord and/or nerve roots. As a results I would like to proceed with a MRI of the patient's cervical, thoracic and lumbar spine. In tandem with this the patient should continue physical therapy exercises focused on their neck and back. I will have the patient follow-up in 3 to 4 weeks for repeat clinical evaluation. I encouraged the patient to reach out to me directly at any point if their symptoms worsen or change in any way.   The patient has tried the following treatments: Activity modification + Ice/Compression + NSAIDS + Physical therapy/Structured home exercise program +

## 2024-01-29 NOTE — PHYSICAL EXAM
[de-identified] : Cervical Physical Exam:  Gait - antalgic   Station- Normal  Sagittal Balance- Normal  Compensatory mechanism - none  Horizontal Gaze- Maintained  Heel walk- difficulty  Toe walk- difficulty  Reflexes: Biceps- Normal Triceps- Normal Brachioradialis- Normal Patellar- Normal Gastroc- Normal Clonus- No  Hudson's- None  Shoulder Exam - Normal  Spurling's- None  Wrist Pulses- 2+ radial/ulnar  Cervical range of motion- significantly reduced  Sensation C5-T1 sensation intact to light touch bilaterally  L1-S1 sensation intact to light touch bilaterally  Motor                  Right/Left - Deltoid     4/4 - Biceps     4/4 - Triceps    4/4 - WF          5/5 - WE          5/5 - IO            5/5 -         5/5  - IP             4/4 - Quad       4/4 - HS           5/5 - TA           5/5 - Gastroc   5/5 - EHL          5/5

## 2024-01-29 NOTE — HISTORY OF PRESENT ILLNESS
[de-identified] : 47 yo female following up for her diffuse neck, mid and low back pain with bilateral upper and lower extremity radiculopathy. She states she feels more off balance lately and has increased numbness to her left anterior thigh. She is being followed by neurology and is on Lyrica which has been helpful to decrease the intensity of her symptoms though still has a lot of pain. She it trying to be active, walking and riding her stationary bike as well as pilates and yoga.   9/6/2023 Today the patient states overall she is doing better.  She has made some fairly significant life choices to help her with her pain symptoms.  This has included dropping down to a 4-day a week schedule.  She is also diligently been working with physical therapy, home exercise program to help herself with her symptoms.  All of these things combined have helped her.  She denies any severe radicular type pain currently.  She denies any bowel bladder issues.  She denies any saddle anesthesia.  7/24/23 Today the patient states that she is still dealing with back and leg symptoms.  Thankfully since the epidural she does feel like her pain has improved.  She denies any bowel bladder issues.  She denies any saddle anesthesia.  She does state that she has been diligently working with a  to regain strength in her back and legs.  05/01/23 47 year old female who presents for initial evaluation of her lower back pain.  The patient states since mid December she has had some fairly disabling bilateral lower extremity pain.  She has left-sided anterior lateral thigh, calf pain.  She also complains of severe right lower extremity pain, calf pain and posterior thigh pain.  She also feels like her right toes are curling.  She denies any bowel bladder issues.  She denies any saddle anesthesia.  She has had 2 rounds of oral steroids.  Unfortunately her symptoms have persisted.  She states that when the pain is bad she cannot walk.

## 2024-01-31 ENCOUNTER — TRANSCRIPTION ENCOUNTER (OUTPATIENT)
Age: 49
End: 2024-01-31

## 2024-02-09 ENCOUNTER — TRANSCRIPTION ENCOUNTER (OUTPATIENT)
Age: 49
End: 2024-02-09

## 2024-02-09 RX ORDER — BENZONATATE 200 MG/1
200 CAPSULE ORAL 3 TIMES DAILY
Qty: 30 | Refills: 1 | Status: COMPLETED | COMMUNITY
Start: 2023-12-22 | End: 2024-02-09

## 2024-02-09 RX ORDER — PROMETHAZINE HYDROCHLORIDE 6.25 MG/5ML
6.25 SOLUTION ORAL
Qty: 200 | Refills: 0 | Status: COMPLETED | COMMUNITY
Start: 2023-12-07 | End: 2024-02-09

## 2024-02-09 RX ORDER — MELOXICAM 15 MG/1
15 TABLET ORAL DAILY
Qty: 14 | Refills: 0 | Status: COMPLETED | COMMUNITY
Start: 2023-07-26 | End: 2024-02-09

## 2024-02-09 RX ORDER — TIZANIDINE 2 MG/1
2 TABLET ORAL
Qty: 24 | Refills: 0 | Status: COMPLETED | COMMUNITY
Start: 2023-06-07 | End: 2024-02-09

## 2024-02-09 RX ORDER — AZITHROMYCIN 250 MG/1
250 TABLET, FILM COATED ORAL
Qty: 1 | Refills: 0 | Status: COMPLETED | COMMUNITY
Start: 2023-12-05 | End: 2024-02-09

## 2024-02-09 RX ORDER — PREDNISONE 20 MG/1
20 TABLET ORAL DAILY
Qty: 30 | Refills: 0 | Status: COMPLETED | COMMUNITY
Start: 2023-12-22 | End: 2024-02-09

## 2024-02-09 RX ORDER — TIZANIDINE 2 MG/1
2 TABLET ORAL EVERY 6 HOURS
Qty: 40 | Refills: 0 | Status: COMPLETED | COMMUNITY
Start: 2023-07-11 | End: 2024-02-09

## 2024-02-13 RX ORDER — TIZANIDINE 2 MG/1
2 TABLET ORAL EVERY 6 HOURS
Qty: 56 | Refills: 1 | Status: ACTIVE | COMMUNITY
Start: 2024-01-29 | End: 1900-01-01

## 2024-03-04 ENCOUNTER — APPOINTMENT (OUTPATIENT)
Dept: ORTHOPEDIC SURGERY | Facility: CLINIC | Age: 49
End: 2024-03-04
Payer: COMMERCIAL

## 2024-03-04 VITALS
WEIGHT: 150 LBS | HEIGHT: 60 IN | SYSTOLIC BLOOD PRESSURE: 146 MMHG | HEART RATE: 96 BPM | BODY MASS INDEX: 29.45 KG/M2 | DIASTOLIC BLOOD PRESSURE: 94 MMHG | TEMPERATURE: 97.2 F

## 2024-03-04 DIAGNOSIS — M54.16 RADICULOPATHY, LUMBAR REGION: ICD-10-CM

## 2024-03-04 DIAGNOSIS — M54.9 DORSALGIA, UNSPECIFIED: ICD-10-CM

## 2024-03-04 DIAGNOSIS — M54.30 DORSALGIA, UNSPECIFIED: ICD-10-CM

## 2024-03-04 PROCEDURE — 99215 OFFICE O/P EST HI 40 MIN: CPT

## 2024-03-04 NOTE — HISTORY OF PRESENT ILLNESS
[de-identified] : Patient is a 48 year old female who presents for f/u eval of diffuse neck, mid and lower bp. The patient details waxing and waning symptoms. Pain begins in lower back radiating around hip, LT LE numbness.   01.29.24 47 yo female following up for her diffuse neck, mid and low back pain with bilateral upper and lower extremity radiculopathy. She states she feels more off balance lately and has increased numbness to her left anterior thigh. She is being followed by neurology and is on Lyrica which has been helpful to decrease the intensity of her symptoms though still has a lot of pain. She it trying to be active, walking and riding her stationary bike as well as pilates and yoga.   9/6/2023 Today the patient states overall she is doing better.  She has made some fairly significant life choices to help her with her pain symptoms.  This has included dropping down to a 4-day a week schedule.  She is also diligently been working with physical therapy, home exercise program to help herself with her symptoms.  All of these things combined have helped her.  She denies any severe radicular type pain currently.  She denies any bowel bladder issues.  She denies any saddle anesthesia.  7/24/23 Today the patient states that she is still dealing with back and leg symptoms.  Thankfully since the epidural she does feel like her pain has improved.  She denies any bowel bladder issues.  She denies any saddle anesthesia.  She does state that she has been diligently working with a  to regain strength in her back and legs.  05/01/23 47 year old female who presents for initial evaluation of her lower back pain.  The patient states since mid December she has had some fairly disabling bilateral lower extremity pain.  She has left-sided anterior lateral thigh, calf pain.  She also complains of severe right lower extremity pain, calf pain and posterior thigh pain.  She also feels like her right toes are curling.  She denies any bowel bladder issues.  She denies any saddle anesthesia.  She has had 2 rounds of oral steroids.  Unfortunately her symptoms have persisted.  She states that when the pain is bad she cannot walk.

## 2024-03-04 NOTE — ADDENDUM
[FreeTextEntry1] :  I, Yasmeen Nelson, acted solely as a scribe for Dr. Jay Sutherland MD on this date 03/04/2024   All medical record entries made by the Scribe were at my, Dr. Jay Sutherland MD., direction and personally dictated by me on 03/04/2024. I have reviewed the chart and agree that the record accurately reflects my personal performance of the history, physical exam, assessment and plan. I have also personally directed, reviewed, and agreed with the chart.

## 2024-03-04 NOTE — PHYSICAL EXAM
[de-identified] : Cervical Physical Exam:  Gait - antalgic   Station- Normal  Sagittal Balance- Normal  Compensatory mechanism - none  Horizontal Gaze- Maintained  Heel walk- difficulty  Toe walk- difficulty  Reflexes: Biceps- Normal Triceps- Normal Brachioradialis- Normal Patellar- Normal Gastroc- Normal Clonus- No  Hudson's- None  Shoulder Exam - Normal  Spurling's- None  Wrist Pulses- 2+ radial/ulnar  Cervical range of motion- significantly reduced  Sensation C5-T1 sensation intact to light touch bilaterally  L1-S1 sensation intact to light touch bilaterally  Motor                  Right/Left - Deltoid     4/4 - Biceps     4/4 - Triceps    4/4 - WF          5/5 - WE          5/5 - IO            5/5 -         5/5  - IP             4/4 - Quad       4/4 - HS           5/5 - TA           5/5 - Gastroc   5/5 - EHL          5/5 [de-identified] : Lumbar MRI L4-L5 severe stenosis  L3-L4 moderate stenosis   Cervical MRI No acute complications

## 2024-03-04 NOTE — ASSESSMENT
[FreeTextEntry1] : I had a lengthy discussion with the patient regarding diagnosis and treament plans. These include, following with pain management for injections, pursuing surgical treatment, or continuing with conservative treatment.  She does have lumbar radiculopathy from L4-L5 severe stenosis and L3-L4 moderate stenosis. She has had an epidural steroid injection which provided temporary pain relief. In my opinion her MRI findings do match her clinical exam. She has tried and failed significant conservative management including epidural steroid injections, therapy, analgesics etc. Unfortunately, their symptoms have persisted. Given her lack of improvement with conservative management, the fact that her imaging findings match her clinical exam I do think that she is an appropriate surgical candidate. We will proceed with an minimally invasive L3-L5 decompression. I did go over the surgical plan using models, and I described the postoperative recovery. We discussed with the patient that the goal of surgery is to bring down the volume of the patients symptoms by 50-60%. The patient will follow up with me when she comes to a decision or decides to pursue further treatment. I encouraged the patient to follow-up sooner if there are any new or worsening symptoms. All questions were answered today. R/B discussed with the patient of surgery at length. Would like to proceed with conservative tx.

## 2024-04-08 ENCOUNTER — APPOINTMENT (OUTPATIENT)
Dept: INTERNAL MEDICINE | Facility: CLINIC | Age: 49
End: 2024-04-08

## 2024-04-15 ENCOUNTER — TRANSCRIPTION ENCOUNTER (OUTPATIENT)
Age: 49
End: 2024-04-15

## 2024-04-17 ENCOUNTER — APPOINTMENT (OUTPATIENT)
Dept: INTERNAL MEDICINE | Facility: CLINIC | Age: 49
End: 2024-04-17

## 2024-04-21 ENCOUNTER — NON-APPOINTMENT (OUTPATIENT)
Age: 49
End: 2024-04-21

## 2024-04-22 ENCOUNTER — TRANSCRIPTION ENCOUNTER (OUTPATIENT)
Age: 49
End: 2024-04-22

## 2024-04-23 ENCOUNTER — APPOINTMENT (OUTPATIENT)
Dept: ORTHOPEDIC SURGERY | Facility: CLINIC | Age: 49
End: 2024-04-23

## 2024-05-03 ENCOUNTER — APPOINTMENT (OUTPATIENT)
Dept: SURGICAL ONCOLOGY | Facility: CLINIC | Age: 49
End: 2024-05-03

## 2024-07-01 ENCOUNTER — TRANSCRIPTION ENCOUNTER (OUTPATIENT)
Age: 49
End: 2024-07-01

## 2024-07-05 ENCOUNTER — APPOINTMENT (OUTPATIENT)
Dept: GASTROENTEROLOGY | Facility: CLINIC | Age: 49
End: 2024-07-05
Payer: COMMERCIAL

## 2024-07-05 VITALS
HEIGHT: 60 IN | BODY MASS INDEX: 28.86 KG/M2 | DIASTOLIC BLOOD PRESSURE: 85 MMHG | SYSTOLIC BLOOD PRESSURE: 138 MMHG | WEIGHT: 147 LBS | OXYGEN SATURATION: 97 % | HEART RATE: 91 BPM

## 2024-07-05 DIAGNOSIS — R14.2 ERUCTATION: ICD-10-CM

## 2024-07-05 DIAGNOSIS — K58.0 IRRITABLE BOWEL SYNDROME WITH DIARRHEA: ICD-10-CM

## 2024-07-05 DIAGNOSIS — R14.0 ABDOMINAL DISTENSION (GASEOUS): ICD-10-CM

## 2024-07-05 PROCEDURE — 99214 OFFICE O/P EST MOD 30 MIN: CPT

## 2024-07-05 RX ORDER — VENLAFAXINE HYDROCHLORIDE 150 MG/1
150 CAPSULE, EXTENDED RELEASE ORAL
Refills: 0 | Status: ACTIVE | COMMUNITY

## 2024-07-05 RX ORDER — RIFAXIMIN 550 MG/1
550 TABLET ORAL EVERY 8 HOURS
Qty: 42 | Refills: 5 | Status: ACTIVE | COMMUNITY
Start: 2024-07-05 | End: 1900-01-01

## 2024-07-05 RX ORDER — PANTOPRAZOLE 40 MG/1
40 TABLET, DELAYED RELEASE ORAL
Refills: 0 | Status: ACTIVE | COMMUNITY

## 2024-07-05 RX ORDER — ALPRAZOLAM 2 MG/1
2 TABLET ORAL
Refills: 0 | Status: ACTIVE | COMMUNITY

## 2024-07-11 ENCOUNTER — TRANSCRIPTION ENCOUNTER (OUTPATIENT)
Age: 49
End: 2024-07-11

## 2024-07-17 ENCOUNTER — TRANSCRIPTION ENCOUNTER (OUTPATIENT)
Age: 49
End: 2024-07-17

## 2024-07-18 ENCOUNTER — TRANSCRIPTION ENCOUNTER (OUTPATIENT)
Age: 49
End: 2024-07-18

## 2024-08-15 ENCOUNTER — APPOINTMENT (OUTPATIENT)
Dept: INTERNAL MEDICINE | Facility: CLINIC | Age: 49
End: 2024-08-15

## 2024-08-15 ENCOUNTER — NON-APPOINTMENT (OUTPATIENT)
Age: 49
End: 2024-08-15

## 2024-08-15 VITALS
HEIGHT: 60 IN | TEMPERATURE: 96.8 F | HEART RATE: 90 BPM | WEIGHT: 142 LBS | BODY MASS INDEX: 27.88 KG/M2 | SYSTOLIC BLOOD PRESSURE: 143 MMHG | DIASTOLIC BLOOD PRESSURE: 99 MMHG | OXYGEN SATURATION: 93 %

## 2024-08-15 PROCEDURE — 93000 ELECTROCARDIOGRAM COMPLETE: CPT

## 2024-08-15 PROCEDURE — 99396 PREV VISIT EST AGE 40-64: CPT

## 2024-08-15 NOTE — HEALTH RISK ASSESSMENT
[Good] : ~his/her~  mood as  good [2 - 4 times a month (2 pts)] : 2-4 times a month (2 points) [Never (0 pts)] : Never (0 points) [No] : In the past 12 months have you used drugs other than those required for medical reasons? No [0] : 2) Feeling down, depressed, or hopeless: Not at all (0) [Never] : Never [Patient reported mammogram was normal] : Patient reported mammogram was normal [Patient reported PAP Smear was normal] : Patient reported PAP Smear was normal [HIV test declined] : HIV test declined [Hepatitis C test declined] : Hepatitis C test declined [de-identified] : now sees surgical oncologist Dr. Maldonado q 6months [de-identified] : socially 2 times a month [Audit-CScore] : 2 [de-identified] : walks daily/ pilates/ yoga weekly and core exercises daily for her back [de-identified] : joined [employer] 3/2024--lost 25 lbs [[id number]] : 2 [MammogramDate] : 10/24 [MammogramComments] : with US of breasts -no longer sees Dr. Maldonado [PapSmearDate] : 01/24 [PapSmearComments] : with Dr. Danielle's PA- LMP was 2/03/2021 -post menopause [ColonoscopyDate] : 10/22 [ColonoscopyComments] : with Dr. Simone Myers with EGD

## 2024-08-15 NOTE — HISTORY OF PRESENT ILLNESS
[FreeTextEntry1] : Pt. presents for a comprehensive evaluation for multiple medical issues.\par   [de-identified] : Patient has been having difficulty over the past 3months because of severe LBP with persistent left sciatica symptoms.\par  She saw orthopedic spine surgeon Dr. Steward and now seeing  Dr. Sutherland and also saw a physiatrist Dr. Zollinger.  She is on NSAIDS, and muscle relaxants and may try gabapentin but doesn't want to take too many medications.\par  she lost 12 lbs with healthier diet and started pilates/yoga and more biking and walking.\par  The low back pain is persistent and worse with prolonged sitting so it does affect her ability to focus at work.\par  \par  She recently purchased her coop and will be closing on her apartment this month.\par  \par  However, she reports chronic anxiety with stress of caring for her mother who suffered multiple medical issues her mother required 2 surgeries in the past 2 years..  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

## 2024-08-15 NOTE — PHYSICAL EXAM
[No Acute Distress] : no acute distress [Well Nourished] : well nourished [Well Developed] : well developed [Well-Appearing] : well-appearing [Normal Sclera/Conjunctiva] : normal sclera/conjunctiva [PERRL] : pupils equal round and reactive to light [EOMI] : extraocular movements intact [Normal Outer Ear/Nose] : the outer ears and nose were normal in appearance [No JVD] : no jugular venous distention [No Lymphadenopathy] : no lymphadenopathy [Supple] : supple [Thyroid Normal, No Nodules] : the thyroid was normal and there were no nodules present [No Respiratory Distress] : no respiratory distress  [No Accessory Muscle Use] : no accessory muscle use [Clear to Auscultation] : lungs were clear to auscultation bilaterally [Normal Rate] : normal rate  [Regular Rhythm] : with a regular rhythm [Normal S1, S2] : normal S1 and S2 [No Murmur] : no murmur heard [No Carotid Bruits] : no carotid bruits [No Abdominal Bruit] : a ~M bruit was not heard ~T in the abdomen [No Varicosities] : no varicosities [Pedal Pulses Present] : the pedal pulses are present [No Edema] : there was no peripheral edema [No Palpable Aorta] : no palpable aorta [No Extremity Clubbing/Cyanosis] : no extremity clubbing/cyanosis [Normal Appearance] : normal in appearance [No Axillary Lymphadenopathy] : no axillary lymphadenopathy [Soft] : abdomen soft [Non Tender] : non-tender [Non-distended] : non-distended [No Masses] : no abdominal mass palpated [No HSM] : no HSM [Normal Bowel Sounds] : normal bowel sounds [No CVA Tenderness] : no CVA  tenderness [No Spinal Tenderness] : no spinal tenderness [No Joint Swelling] : no joint swelling [Grossly Normal Strength/Tone] : grossly normal strength/tone [No Rash] : no rash [Coordination Grossly Intact] : coordination grossly intact [No Focal Deficits] : no focal deficits [Normal Gait] : normal gait [Deep Tendon Reflexes (DTR)] : deep tendon reflexes were 2+ and symmetric [Normal Affect] : the affect was normal [Normal Insight/Judgement] : insight and judgment were intact [Normal] : affect was normal and insight and judgment were intact [de-identified] : s/p right breast surgical scar

## 2024-08-22 LAB
25(OH)D3 SERPL-MCNC: 38.8 NG/ML
ALBUMIN SERPL ELPH-MCNC: 4.8 G/DL
ALP BLD-CCNC: 102 U/L
ALT SERPL-CCNC: 17 U/L
ANION GAP SERPL CALC-SCNC: 14 MMOL/L
APPEARANCE: CLEAR
AST SERPL-CCNC: 15 U/L
BASOPHILS # BLD AUTO: 0.06 K/UL
BASOPHILS NFR BLD AUTO: 0.5 %
BILIRUB SERPL-MCNC: 0.4 MG/DL
BILIRUBIN URINE: NEGATIVE
BLOOD URINE: NEGATIVE
BUN SERPL-MCNC: 14 MG/DL
CALCIUM SERPL-MCNC: 9.9 MG/DL
CHLORIDE SERPL-SCNC: 100 MMOL/L
CHOLEST SERPL-MCNC: 212 MG/DL
CO2 SERPL-SCNC: 26 MMOL/L
COLOR: YELLOW
CREAT SERPL-MCNC: 0.62 MG/DL
EGFR: 109 ML/MIN/1.73M2
EOSINOPHIL # BLD AUTO: 0.13 K/UL
EOSINOPHIL NFR BLD AUTO: 1.1 %
ERYTHROCYTE [SEDIMENTATION RATE] IN BLOOD BY WESTERGREN METHOD: 18 MM/HR
ESTIMATED AVERAGE GLUCOSE: 105 MG/DL
GLUCOSE QUALITATIVE U: NEGATIVE MG/DL
GLUCOSE SERPL-MCNC: 74 MG/DL
HBA1C MFR BLD HPLC: 5.3 %
HCT VFR BLD CALC: 50.6 %
HDLC SERPL-MCNC: 75 MG/DL
HGB BLD-MCNC: 15.8 G/DL
IMM GRANULOCYTES NFR BLD AUTO: 0.3 %
KETONES URINE: NEGATIVE MG/DL
LDLC SERPL CALC-MCNC: 124 MG/DL
LEUKOCYTE ESTERASE URINE: NEGATIVE
LYMPHOCYTES # BLD AUTO: 4.03 K/UL
LYMPHOCYTES NFR BLD AUTO: 33.6 %
MAN DIFF?: NORMAL
MCHC RBC-ENTMCNC: 31.2 GM/DL
MCHC RBC-ENTMCNC: 31.3 PG
MCV RBC AUTO: 100.4 FL
MONOCYTES # BLD AUTO: 0.75 K/UL
MONOCYTES NFR BLD AUTO: 6.2 %
NEUTROPHILS # BLD AUTO: 7 K/UL
NEUTROPHILS NFR BLD AUTO: 58.3 %
NITRITE URINE: NEGATIVE
NONHDLC SERPL-MCNC: 137 MG/DL
PH URINE: 7
PLATELET # BLD AUTO: 378 K/UL
POTASSIUM SERPL-SCNC: 3.5 MMOL/L
PROT SERPL-MCNC: 8.1 G/DL
PROTEIN URINE: NEGATIVE MG/DL
RBC # BLD: 5.04 M/UL
RBC # FLD: 13 %
SODIUM SERPL-SCNC: 140 MMOL/L
SPECIFIC GRAVITY URINE: 1.01
T3FREE SERPL-MCNC: 2.59 PG/ML
T4 FREE SERPL-MCNC: 1.2 NG/DL
TRIGL SERPL-MCNC: 73 MG/DL
TSH SERPL-ACNC: 1.8 UIU/ML
UROBILINOGEN URINE: 0.2 MG/DL
VIT B12 SERPL-MCNC: 492 PG/ML
WBC # FLD AUTO: 12.01 K/UL

## 2024-08-24 ENCOUNTER — NON-APPOINTMENT (OUTPATIENT)
Age: 49
End: 2024-08-24

## 2024-08-26 ENCOUNTER — APPOINTMENT (OUTPATIENT)
Dept: INTERNAL MEDICINE | Facility: CLINIC | Age: 49
End: 2024-08-26
Payer: COMMERCIAL

## 2024-08-26 ENCOUNTER — TRANSCRIPTION ENCOUNTER (OUTPATIENT)
Age: 49
End: 2024-08-26

## 2024-08-26 VITALS
WEIGHT: 142 LBS | DIASTOLIC BLOOD PRESSURE: 81 MMHG | OXYGEN SATURATION: 99 % | SYSTOLIC BLOOD PRESSURE: 116 MMHG | HEIGHT: 60 IN | BODY MASS INDEX: 27.88 KG/M2 | HEART RATE: 80 BPM

## 2024-08-26 DIAGNOSIS — L30.8 OTHER SPECIFIED DERMATITIS: ICD-10-CM

## 2024-08-26 DIAGNOSIS — A09 INFECTIOUS GASTROENTERITIS AND COLITIS, UNSPECIFIED: ICD-10-CM

## 2024-08-26 DIAGNOSIS — B88.2 OTHER ARTHROPOD INFESTATIONS: ICD-10-CM

## 2024-08-26 PROCEDURE — 99214 OFFICE O/P EST MOD 30 MIN: CPT

## 2024-08-26 RX ORDER — CLOBETASOL PROPIONATE 0.5 MG/G
0.05 GEL TOPICAL
Qty: 1 | Refills: 1 | Status: ACTIVE | COMMUNITY
Start: 2024-08-26 | End: 1900-01-01

## 2024-08-26 RX ORDER — LEVOCETIRIZINE DIHYDROCHLORIDE 5 MG/1
5 TABLET ORAL DAILY
Qty: 30 | Refills: 0 | Status: ACTIVE | COMMUNITY
Start: 2024-08-26 | End: 1900-01-01

## 2024-08-26 RX ORDER — CIPROFLOXACIN HYDROCHLORIDE 500 MG/1
500 TABLET, FILM COATED ORAL TWICE DAILY
Qty: 10 | Refills: 0 | Status: ACTIVE | COMMUNITY
Start: 2024-08-26 | End: 1900-01-01

## 2024-08-26 NOTE — HISTORY OF PRESENT ILLNESS
[FreeTextEntry8] : pt was on vacation in [facility] from 08/17/2024 through 08/21/2024 She got many mosquito bites all over her body-with severe pruritus on the lsat 2 days of her trip. Upon returning , she noted onset of malaise, fatigue/chills/ ? low grade fevers and persistent itchy scattered skin lesions all over c/w mosquito bites.  2 days later- she had onset of frequent diarrhea every time she eats--up to 5-6 times a day for the past 3 days. She went to a birthday party 2 days ago-was only able to drink soda.

## 2024-08-26 NOTE — PHYSICAL EXAM
[Soft] : abdomen soft [Non Tender] : non-tender [Non-distended] : non-distended [No HSM] : no HSM [Normal Bowel Sounds] : normal bowel sounds [Normal] : soft, non-tender, non-distended, no masses palpated, no HSM and normal bowel sounds [de-identified] : scattered red raised skin lesions along bilateral UE and LE and chest/back c/w mosquito bites

## 2024-08-26 NOTE — PLAN
[FreeTextEntry1] : unable to obtain blood from pt despite several attempts--will hydrate and go to [facility] tomorrow am

## 2024-08-26 NOTE — REVIEW OF SYSTEMS
[Chills] : chills [Fatigue] : fatigue [Diarrhea] : diarrhea [Itching] : Itching [Skin Rash] : skin rash

## 2024-08-27 ENCOUNTER — TRANSCRIPTION ENCOUNTER (OUTPATIENT)
Age: 49
End: 2024-08-27

## 2024-08-28 LAB
ALBUMIN SERPL ELPH-MCNC: 4.4 G/DL
ALP BLD-CCNC: 100 U/L
ALT SERPL-CCNC: 18 U/L
ANION GAP SERPL CALC-SCNC: 14 MMOL/L
AST SERPL-CCNC: 13 U/L
BASOPHILS # BLD AUTO: 0.04 K/UL
BASOPHILS NFR BLD AUTO: 0.6 %
BILIRUB SERPL-MCNC: <0.2 MG/DL
BUN SERPL-MCNC: 12 MG/DL
CALCIUM SERPL-MCNC: 9.5 MG/DL
CHLORIDE SERPL-SCNC: 103 MMOL/L
CO2 SERPL-SCNC: 23 MMOL/L
CREAT SERPL-MCNC: 0.7 MG/DL
CRP SERPL-MCNC: <3 MG/L
EGFR: 105 ML/MIN/1.73M2
EOSINOPHIL # BLD AUTO: 0.21 K/UL
EOSINOPHIL NFR BLD AUTO: 3.1 %
ERYTHROCYTE [SEDIMENTATION RATE] IN BLOOD BY WESTERGREN METHOD: 14 MM/HR
GLUCOSE SERPL-MCNC: 80 MG/DL
HCT VFR BLD CALC: 43.5 %
HGB BLD-MCNC: 14.2 G/DL
IMM GRANULOCYTES NFR BLD AUTO: 0.1 %
LYMPHOCYTES # BLD AUTO: 2.61 K/UL
LYMPHOCYTES NFR BLD AUTO: 38.3 %
MAN DIFF?: NORMAL
MCHC RBC-ENTMCNC: 31.6 PG
MCHC RBC-ENTMCNC: 32.6 GM/DL
MCV RBC AUTO: 96.7 FL
MONOCYTES # BLD AUTO: 0.55 K/UL
MONOCYTES NFR BLD AUTO: 8.1 %
NEUTROPHILS # BLD AUTO: 3.39 K/UL
NEUTROPHILS NFR BLD AUTO: 49.8 %
PLATELET # BLD AUTO: 315 K/UL
POTASSIUM SERPL-SCNC: 4.8 MMOL/L
PROT SERPL-MCNC: 6.9 G/DL
RBC # BLD: 4.5 M/UL
RBC # FLD: 12.4 %
SODIUM SERPL-SCNC: 139 MMOL/L
WBC # FLD AUTO: 6.81 K/UL

## 2024-09-03 LAB
DENV IGG SERPL QL IA: <1 ISR
DENV IGM SERPL QL IA: 1.15 ISR
WNV IGG SPEC QL: NEGATIVE
WNV IGM SPEC QL: NEGATIVE

## 2024-10-17 ENCOUNTER — TRANSCRIPTION ENCOUNTER (OUTPATIENT)
Age: 49
End: 2024-10-17

## 2024-11-14 ENCOUNTER — APPOINTMENT (OUTPATIENT)
Dept: INTERNAL MEDICINE | Facility: CLINIC | Age: 49
End: 2024-11-14

## 2024-11-14 VITALS
WEIGHT: 148 LBS | BODY MASS INDEX: 29.06 KG/M2 | SYSTOLIC BLOOD PRESSURE: 132 MMHG | DIASTOLIC BLOOD PRESSURE: 90 MMHG | TEMPERATURE: 96.5 F | OXYGEN SATURATION: 98 % | HEART RATE: 101 BPM | HEIGHT: 60 IN

## 2024-11-14 DIAGNOSIS — J01.90 ACUTE SINUSITIS, UNSPECIFIED: ICD-10-CM

## 2024-11-14 PROCEDURE — 99213 OFFICE O/P EST LOW 20 MIN: CPT

## 2024-11-14 RX ORDER — BENZONATATE 200 MG/1
200 CAPSULE ORAL
Qty: 30 | Refills: 0 | Status: ACTIVE | COMMUNITY
Start: 2024-11-14 | End: 1900-01-01

## 2024-11-15 ENCOUNTER — NON-APPOINTMENT (OUTPATIENT)
Age: 49
End: 2024-11-15

## 2024-11-18 LAB
INFLUENZA A RESULT: NOT DETECTED
INFLUENZA B RESULT: NOT DETECTED
RESP SYN VIRUS RESULT: NOT DETECTED
SARS-COV-2 RESULT: NOT DETECTED

## 2024-11-19 ENCOUNTER — NON-APPOINTMENT (OUTPATIENT)
Age: 49
End: 2024-11-19

## 2024-11-20 ENCOUNTER — TRANSCRIPTION ENCOUNTER (OUTPATIENT)
Age: 49
End: 2024-11-20

## 2024-11-20 RX ORDER — HYDROCODONE BITARTRATE AND HOMATROPINE METHYLBROMIDE 1.5; 5 MG/5ML; MG/5ML
5-1.5 SOLUTION ORAL
Qty: 240 | Refills: 0 | Status: ACTIVE | COMMUNITY
Start: 2024-11-14 | End: 1900-01-01

## 2024-11-22 ENCOUNTER — TRANSCRIPTION ENCOUNTER (OUTPATIENT)
Age: 49
End: 2024-11-22

## 2024-11-24 ENCOUNTER — NON-APPOINTMENT (OUTPATIENT)
Age: 49
End: 2024-11-24

## 2024-11-25 ENCOUNTER — TRANSCRIPTION ENCOUNTER (OUTPATIENT)
Age: 49
End: 2024-11-25

## 2024-11-25 ENCOUNTER — APPOINTMENT (OUTPATIENT)
Dept: RADIOLOGY | Facility: CLINIC | Age: 49
End: 2024-11-25
Payer: COMMERCIAL

## 2024-11-25 ENCOUNTER — OUTPATIENT (OUTPATIENT)
Dept: OUTPATIENT SERVICES | Facility: HOSPITAL | Age: 49
LOS: 1 days | End: 2024-11-25
Payer: COMMERCIAL

## 2024-11-25 DIAGNOSIS — Z98.890 OTHER SPECIFIED POSTPROCEDURAL STATES: Chronic | ICD-10-CM

## 2024-11-25 PROCEDURE — 71046 X-RAY EXAM CHEST 2 VIEWS: CPT

## 2024-11-25 PROCEDURE — 71046 X-RAY EXAM CHEST 2 VIEWS: CPT | Mod: 26

## 2024-12-30 ENCOUNTER — TRANSCRIPTION ENCOUNTER (OUTPATIENT)
Age: 49
End: 2024-12-30

## 2025-01-14 ENCOUNTER — APPOINTMENT (OUTPATIENT)
Dept: GASTROENTEROLOGY | Facility: CLINIC | Age: 50
End: 2025-01-14
Payer: COMMERCIAL

## 2025-01-14 VITALS
TEMPERATURE: 98.1 F | WEIGHT: 150 LBS | OXYGEN SATURATION: 98 % | HEIGHT: 60 IN | HEART RATE: 100 BPM | SYSTOLIC BLOOD PRESSURE: 164 MMHG | DIASTOLIC BLOOD PRESSURE: 130 MMHG | BODY MASS INDEX: 29.45 KG/M2

## 2025-01-14 DIAGNOSIS — R14.0 ABDOMINAL DISTENSION (GASEOUS): ICD-10-CM

## 2025-01-14 DIAGNOSIS — R14.2 ERUCTATION: ICD-10-CM

## 2025-01-14 PROCEDURE — G2211 COMPLEX E/M VISIT ADD ON: CPT | Mod: NC

## 2025-01-14 PROCEDURE — 99214 OFFICE O/P EST MOD 30 MIN: CPT

## 2025-01-15 ENCOUNTER — TRANSCRIPTION ENCOUNTER (OUTPATIENT)
Age: 50
End: 2025-01-15

## 2025-01-16 ENCOUNTER — TRANSCRIPTION ENCOUNTER (OUTPATIENT)
Age: 50
End: 2025-01-16

## 2025-01-23 NOTE — H&P PST ADULT - ASSESSMENT
01/23/25        Transitional Care Coordination Progress Note:   Patient discussed during interdisciplinary rounds.   Team members present: RN GELY FERRARI MD   Plan per Medical/Surgical team: further medical optimization, anginal symptom stability.   Discharge disposition: Home   Status-Inpatient   Payer- MEDICARE   barriers: None   ADOD: 1-  Saroj Villegas RN [facility] [phone]        47 yo female presents to PST unit with pre-op diagnosis of other signs and symptoms in breast scheduled for resection portion of left breast nipple and areola with Dr. Maldonado.

## 2025-02-03 ENCOUNTER — APPOINTMENT (OUTPATIENT)
Dept: UROGYNECOLOGY | Facility: CLINIC | Age: 50
End: 2025-02-03

## 2025-02-11 ENCOUNTER — TRANSCRIPTION ENCOUNTER (OUTPATIENT)
Age: 50
End: 2025-02-11

## 2025-02-14 ENCOUNTER — APPOINTMENT (OUTPATIENT)
Dept: INTERNAL MEDICINE | Facility: CLINIC | Age: 50
End: 2025-02-14

## 2025-02-27 ENCOUNTER — APPOINTMENT (OUTPATIENT)
Dept: INTERNAL MEDICINE | Facility: CLINIC | Age: 50
End: 2025-02-27

## 2025-02-27 VITALS
TEMPERATURE: 97.4 F | HEIGHT: 60 IN | BODY MASS INDEX: 28.86 KG/M2 | SYSTOLIC BLOOD PRESSURE: 140 MMHG | HEART RATE: 105 BPM | OXYGEN SATURATION: 100 % | DIASTOLIC BLOOD PRESSURE: 94 MMHG | WEIGHT: 147 LBS

## 2025-02-27 DIAGNOSIS — I10 ESSENTIAL (PRIMARY) HYPERTENSION: ICD-10-CM

## 2025-02-27 PROCEDURE — 99213 OFFICE O/P EST LOW 20 MIN: CPT

## 2025-02-27 RX ORDER — METOPROLOL SUCCINATE 25 MG/1
25 TABLET, EXTENDED RELEASE ORAL
Qty: 90 | Refills: 0 | Status: ACTIVE | COMMUNITY
Start: 2025-02-27 | End: 1900-01-01

## 2025-03-01 ENCOUNTER — TRANSCRIPTION ENCOUNTER (OUTPATIENT)
Age: 50
End: 2025-03-01

## 2025-03-06 ENCOUNTER — APPOINTMENT (OUTPATIENT)
Dept: CARDIOLOGY | Facility: CLINIC | Age: 50
End: 2025-03-06
Payer: COMMERCIAL

## 2025-03-06 PROCEDURE — 93306 TTE W/DOPPLER COMPLETE: CPT

## 2025-03-11 ENCOUNTER — TRANSCRIPTION ENCOUNTER (OUTPATIENT)
Age: 50
End: 2025-03-11

## 2025-03-12 ENCOUNTER — TRANSCRIPTION ENCOUNTER (OUTPATIENT)
Age: 50
End: 2025-03-12

## 2025-03-17 ENCOUNTER — TRANSCRIPTION ENCOUNTER (OUTPATIENT)
Age: 50
End: 2025-03-17

## 2025-04-04 ENCOUNTER — TRANSCRIPTION ENCOUNTER (OUTPATIENT)
Age: 50
End: 2025-04-04

## 2025-04-07 ENCOUNTER — TRANSCRIPTION ENCOUNTER (OUTPATIENT)
Age: 50
End: 2025-04-07

## 2025-04-15 ENCOUNTER — APPOINTMENT (OUTPATIENT)
Dept: ULTRASOUND IMAGING | Facility: CLINIC | Age: 50
End: 2025-04-15
Payer: COMMERCIAL

## 2025-04-15 ENCOUNTER — APPOINTMENT (OUTPATIENT)
Dept: MAMMOGRAPHY | Facility: CLINIC | Age: 50
End: 2025-04-15
Payer: COMMERCIAL

## 2025-04-15 PROCEDURE — 76856 US EXAM PELVIC COMPLETE: CPT

## 2025-04-15 PROCEDURE — 76830 TRANSVAGINAL US NON-OB: CPT

## 2025-04-15 PROCEDURE — 77066 DX MAMMO INCL CAD BI: CPT

## 2025-04-15 PROCEDURE — 76641 ULTRASOUND BREAST COMPLETE: CPT | Mod: 50

## 2025-04-15 PROCEDURE — G0279: CPT

## 2025-04-28 ENCOUNTER — APPOINTMENT (OUTPATIENT)
Dept: INTERNAL MEDICINE | Facility: CLINIC | Age: 50
End: 2025-04-28

## 2025-04-28 VITALS
SYSTOLIC BLOOD PRESSURE: 125 MMHG | WEIGHT: 154 LBS | HEIGHT: 60 IN | BODY MASS INDEX: 30.23 KG/M2 | OXYGEN SATURATION: 100 % | DIASTOLIC BLOOD PRESSURE: 83 MMHG | TEMPERATURE: 98.1 F | HEART RATE: 75 BPM

## 2025-04-28 VITALS — SYSTOLIC BLOOD PRESSURE: 112 MMHG | DIASTOLIC BLOOD PRESSURE: 78 MMHG

## 2025-04-28 PROCEDURE — 99213 OFFICE O/P EST LOW 20 MIN: CPT

## 2025-05-12 ENCOUNTER — TRANSCRIPTION ENCOUNTER (OUTPATIENT)
Age: 50
End: 2025-05-12

## 2025-05-13 ENCOUNTER — TRANSCRIPTION ENCOUNTER (OUTPATIENT)
Age: 50
End: 2025-05-13

## 2025-06-04 ENCOUNTER — TRANSCRIPTION ENCOUNTER (OUTPATIENT)
Age: 50
End: 2025-06-04

## 2025-06-05 ENCOUNTER — APPOINTMENT (OUTPATIENT)
Dept: INTERNAL MEDICINE | Facility: CLINIC | Age: 50
End: 2025-06-05
Payer: MEDICAID

## 2025-06-05 VITALS
SYSTOLIC BLOOD PRESSURE: 130 MMHG | HEART RATE: 74 BPM | WEIGHT: 157 LBS | OXYGEN SATURATION: 96 % | BODY MASS INDEX: 30.82 KG/M2 | DIASTOLIC BLOOD PRESSURE: 85 MMHG | HEIGHT: 60 IN | TEMPERATURE: 97.3 F

## 2025-06-05 DIAGNOSIS — Z11.1 ENCOUNTER FOR SCREENING FOR RESPIRATORY TUBERCULOSIS: ICD-10-CM

## 2025-06-05 DIAGNOSIS — I10 ESSENTIAL (PRIMARY) HYPERTENSION: ICD-10-CM

## 2025-06-05 DIAGNOSIS — Z23 ENCOUNTER FOR IMMUNIZATION: ICD-10-CM

## 2025-06-05 DIAGNOSIS — Z01.84 ENCOUNTER FOR ANTIBODY RESPONSE EXAMINATION: ICD-10-CM

## 2025-06-05 PROCEDURE — 90715 TDAP VACCINE 7 YRS/> IM: CPT

## 2025-06-05 PROCEDURE — 90471 IMMUNIZATION ADMIN: CPT

## 2025-06-05 PROCEDURE — 99213 OFFICE O/P EST LOW 20 MIN: CPT | Mod: 25

## 2025-06-09 ENCOUNTER — APPOINTMENT (OUTPATIENT)
Dept: INTERNAL MEDICINE | Facility: CLINIC | Age: 50
End: 2025-06-09
Payer: MEDICAID

## 2025-06-09 PROBLEM — Z23 NEED FOR MMR VACCINE: Status: ACTIVE | Noted: 2025-06-09

## 2025-06-09 LAB
HBV SURFACE AB SERPL IA-ACNC: <3.3 MIU/ML
HBV SURFACE AG SER QL: NONREACTIVE
M TB IFN-G BLD-IMP: NEGATIVE
MEV IGG FLD QL IA: 90.7 AU/ML
MEV IGG+IGM SER-IMP: POSITIVE
MUV AB SER-ACNC: NEGATIVE
MUV IGG SER QL IA: <5 AU/ML
QUANTIFERON TB PLUS MITOGEN MINUS NIL: 4.38 IU/ML
QUANTIFERON TB PLUS NIL: 0.03 IU/ML
QUANTIFERON TB PLUS TB1 MINUS NIL: 0 IU/ML
QUANTIFERON TB PLUS TB2 MINUS NIL: 0 IU/ML
RUBV IGG FLD-ACNC: 1.53 INDEX
RUBV IGG SER-IMP: POSITIVE
VZV AB TITR SER: POSITIVE
VZV IGG SER IF-ACNC: 6.76 S/CO

## 2025-06-09 PROCEDURE — 90471 IMMUNIZATION ADMIN: CPT

## 2025-06-09 PROCEDURE — 90707 MMR VACCINE SC: CPT

## 2025-06-16 ENCOUNTER — APPOINTMENT (OUTPATIENT)
Dept: INTERNAL MEDICINE | Facility: CLINIC | Age: 50
End: 2025-06-16

## 2025-07-22 ENCOUNTER — TRANSCRIPTION ENCOUNTER (OUTPATIENT)
Age: 50
End: 2025-07-22

## 2025-08-13 ENCOUNTER — APPOINTMENT (OUTPATIENT)
Dept: INTERNAL MEDICINE | Facility: CLINIC | Age: 50
End: 2025-08-13
Payer: MEDICAID

## 2025-08-13 PROCEDURE — 36415 COLL VENOUS BLD VENIPUNCTURE: CPT

## 2025-08-14 ENCOUNTER — NON-APPOINTMENT (OUTPATIENT)
Age: 50
End: 2025-08-14

## 2025-08-14 LAB
25(OH)D3 SERPL-MCNC: 29.9 NG/ML
ALBUMIN SERPL ELPH-MCNC: 4.1 G/DL
ALP BLD-CCNC: 92 U/L
ALT SERPL-CCNC: 20 U/L
ANION GAP SERPL CALC-SCNC: 15 MMOL/L
APPEARANCE: CLEAR
AST SERPL-CCNC: 23 U/L
BASOPHILS # BLD AUTO: 0.05 K/UL
BASOPHILS NFR BLD AUTO: 0.6 %
BILIRUB SERPL-MCNC: 0.3 MG/DL
BILIRUBIN URINE: NEGATIVE
BLOOD URINE: NEGATIVE
BUN SERPL-MCNC: 12 MG/DL
CALCIUM SERPL-MCNC: 9.6 MG/DL
CHLORIDE SERPL-SCNC: 102 MMOL/L
CHOLEST SERPL-MCNC: 191 MG/DL
CO2 SERPL-SCNC: 21 MMOL/L
COLOR: YELLOW
CREAT SERPL-MCNC: 0.64 MG/DL
EGFRCR SERPLBLD CKD-EPI 2021: 108 ML/MIN/1.73M2
EOSINOPHIL # BLD AUTO: 0.27 K/UL
EOSINOPHIL NFR BLD AUTO: 3 %
ERYTHROCYTE [SEDIMENTATION RATE] IN BLOOD BY WESTERGREN METHOD: 17 MM/HR
ESTIMATED AVERAGE GLUCOSE: 117 MG/DL
GLUCOSE QUALITATIVE U: NEGATIVE MG/DL
GLUCOSE SERPL-MCNC: 91 MG/DL
HBA1C MFR BLD HPLC: 5.7 %
HCT VFR BLD CALC: 43.2 %
HDLC SERPL-MCNC: 44 MG/DL
HGB BLD-MCNC: 13.7 G/DL
IMM GRANULOCYTES NFR BLD AUTO: 0.3 %
KETONES URINE: NEGATIVE MG/DL
LDLC SERPL-MCNC: 133 MG/DL
LEUKOCYTE ESTERASE URINE: NEGATIVE
LYMPHOCYTES # BLD AUTO: 3.23 K/UL
LYMPHOCYTES NFR BLD AUTO: 36.5 %
MAN DIFF?: NORMAL
MCHC RBC-ENTMCNC: 29.6 PG
MCHC RBC-ENTMCNC: 31.7 G/DL
MCV RBC AUTO: 93.3 FL
MEV IGG FLD QL IA: 120 AU/ML
MEV IGG+IGM SER-IMP: POSITIVE
MONOCYTES # BLD AUTO: 0.67 K/UL
MONOCYTES NFR BLD AUTO: 7.6 %
MUV AB SER-ACNC: NEGATIVE
MUV IGG SER QL IA: <5 AU/ML
NEUTROPHILS # BLD AUTO: 4.61 K/UL
NEUTROPHILS NFR BLD AUTO: 52 %
NITRITE URINE: NEGATIVE
NONHDLC SERPL-MCNC: 148 MG/DL
PH URINE: 6.5
PLATELET # BLD AUTO: 312 K/UL
POTASSIUM SERPL-SCNC: 4.4 MMOL/L
PROT SERPL-MCNC: 7.3 G/DL
PROTEIN URINE: NEGATIVE MG/DL
RBC # BLD: 4.63 M/UL
RBC # FLD: 11.9 %
RUBV IGG FLD-ACNC: 18.5 INDEX
RUBV IGG SER-IMP: POSITIVE
SODIUM SERPL-SCNC: 137 MMOL/L
SPECIFIC GRAVITY URINE: 1.01
T4 FREE SERPL-MCNC: 0.9 NG/DL
TRIGL SERPL-MCNC: 78 MG/DL
TSH SERPL-ACNC: 0.97 UIU/ML
UROBILINOGEN URINE: 0.2 MG/DL
VIT B12 SERPL-MCNC: 462 PG/ML
VZV AB TITR SER: POSITIVE
VZV IGG SER IF-ACNC: 6.83 S/CO
WBC # FLD AUTO: 8.86 K/UL

## 2025-08-20 ENCOUNTER — APPOINTMENT (OUTPATIENT)
Dept: INTERNAL MEDICINE | Facility: CLINIC | Age: 50
End: 2025-08-20
Payer: MEDICAID

## 2025-08-20 VITALS
BODY MASS INDEX: 30.82 KG/M2 | WEIGHT: 157 LBS | TEMPERATURE: 96.6 F | SYSTOLIC BLOOD PRESSURE: 116 MMHG | DIASTOLIC BLOOD PRESSURE: 78 MMHG | HEIGHT: 60 IN | OXYGEN SATURATION: 95 % | HEART RATE: 77 BPM

## 2025-08-20 DIAGNOSIS — I10 ESSENTIAL (PRIMARY) HYPERTENSION: ICD-10-CM

## 2025-08-20 DIAGNOSIS — N76.0 ACUTE VAGINITIS: ICD-10-CM

## 2025-08-20 DIAGNOSIS — Z00.00 ENCOUNTER FOR GENERAL ADULT MEDICAL EXAMINATION W/OUT ABNORMAL FINDINGS: ICD-10-CM

## 2025-08-20 DIAGNOSIS — F41.9 ANXIETY DISORDER, UNSPECIFIED: ICD-10-CM

## 2025-08-20 PROCEDURE — 90707 MMR VACCINE SC: CPT

## 2025-08-20 PROCEDURE — 99396 PREV VISIT EST AGE 40-64: CPT | Mod: 25

## 2025-08-20 PROCEDURE — 90471 IMMUNIZATION ADMIN: CPT

## 2025-08-20 PROCEDURE — 93000 ELECTROCARDIOGRAM COMPLETE: CPT

## 2025-08-20 RX ORDER — FLUCONAZOLE 150 MG/1
150 TABLET ORAL
Qty: 3 | Refills: 0 | Status: ACTIVE | COMMUNITY
Start: 2025-08-20 | End: 1900-01-01

## 2025-09-15 ENCOUNTER — RX RENEWAL (OUTPATIENT)
Age: 50
End: 2025-09-15